# Patient Record
Sex: FEMALE | Race: WHITE | NOT HISPANIC OR LATINO | Employment: OTHER | URBAN - METROPOLITAN AREA
[De-identification: names, ages, dates, MRNs, and addresses within clinical notes are randomized per-mention and may not be internally consistent; named-entity substitution may affect disease eponyms.]

---

## 2020-10-16 ENCOUNTER — OFFICE VISIT (OUTPATIENT)
Dept: GASTROENTEROLOGY | Facility: CLINIC | Age: 75
End: 2020-10-16

## 2020-10-16 VITALS
HEART RATE: 59 BPM | DIASTOLIC BLOOD PRESSURE: 70 MMHG | TEMPERATURE: 97.7 F | HEIGHT: 68 IN | BODY MASS INDEX: 34.68 KG/M2 | SYSTOLIC BLOOD PRESSURE: 161 MMHG | WEIGHT: 228.8 LBS

## 2020-10-16 DIAGNOSIS — K75.4 AUTOIMMUNE HEPATITIS (HCC): ICD-10-CM

## 2020-10-16 DIAGNOSIS — K74.3 PRIMARY BILIARY CHOLANGITIS (HCC): Primary | ICD-10-CM

## 2020-10-16 PROCEDURE — 99203 OFFICE O/P NEW LOW 30 MIN: CPT | Performed by: NURSE PRACTITIONER

## 2020-10-16 RX ORDER — LISINOPRIL AND HYDROCHLOROTHIAZIDE 12.5; 1 MG/1; MG/1
1 TABLET ORAL DAILY
COMMUNITY
Start: 2020-08-11

## 2020-10-16 RX ORDER — INFLUENZA A VIRUS A/MICHIGAN/45/2015 X-275 (H1N1) ANTIGEN (FORMALDEHYDE INACTIVATED), INFLUENZA A VIRUS A/SINGAPORE/INFIMH-16-0019/2016 IVR-186 (H3N2) ANTIGEN (FORMALDEHYDE INACTIVATED), INFLUENZA B VIRUS B/PHUKET/3073/2013 ANTIGEN (FORMALDEHYDE INACTIVATED), AND INFLUENZA B VIRUS B/MARYLAND/15/2016 BX-69A ANTIGEN (FORMALDEHYDE INACTIVATED) 60; 60; 60; 60 UG/.7ML; UG/.7ML; UG/.7ML; UG/.7ML
1 INJECTION, SUSPENSION INTRAMUSCULAR ONCE
Status: ON HOLD | COMMUNITY
Start: 2020-09-25 | End: 2021-08-31

## 2020-10-16 RX ORDER — URSODIOL 300 MG/1
1 CAPSULE ORAL DAILY
COMMUNITY
Start: 2020-09-21 | End: 2020-10-16 | Stop reason: SDUPTHER

## 2020-10-16 RX ORDER — CELECOXIB 200 MG/1
1 CAPSULE ORAL DAILY
Status: ON HOLD | COMMUNITY
Start: 2020-08-11 | End: 2021-06-16

## 2020-10-16 RX ORDER — URSODIOL 300 MG/1
1500 CAPSULE ORAL DAILY
Qty: 150 CAPSULE | Refills: 11 | Status: SHIPPED | OUTPATIENT
Start: 2021-01-01 | End: 2021-11-08

## 2020-10-16 NOTE — PROGRESS NOTES
GASTROENTEROLOGY OFFICE NOTE  Radha Negrete  [id number]  1945    CARE TEAM  Patient Care Team:  Joseluis Cunningham MD as PCP - General  Joseluis Cunningham MD as PCP - Family Medicine    Referring Provider: Joseluis Cunningham MD    Chief Complaint   Patient presents with   • Elevated Hepatic Enzymes     f/u        HISTORY OF PRESENT ILLNESS:  Ms. 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  She was diagnosed at  and has been followed by Dr. Dietrich but would like to transfer her care to [facility].  There is very little information available to me regarding her diagnosis other than an office note from December 2019 from Dr. Dietrich that confirms her diagnosis of PBC/AIH and states that she is tolerating Devin without issue.  Lab work available for review shows liver enzymes in May 2019 AST 19/ALT 10/ and in July 2019 AST 21/ALT 12/.  She had an ultrasound liver in August 2018 that showed fatty infiltration of the liver and blood work at that time showed AST 20/ALT 12/.  She reports that she has had a liver biopsy.  She is not quite certain how long she has been treated for PBC/AIH.  Overall she is doing well without any localizing GI complaints.  She exhibits no stigmata of advanced liver disease.    PAST MEDICAL HISTORY  Past Medical History:   Diagnosis Date   • Arthritis    • Hypertension         PAST SURGICAL HISTORY  Past Surgical History:   Procedure Laterality Date   • BLADDER SURGERY     • CARDIAC SURGERY     • CHOLECYSTECTOMY     • COLONOSCOPY  01/2016    normal   • HYSTERECTOMY          MEDICATIONS:    Current Outpatient Medications:   •  celecoxib (CeleBREX) 200 MG capsule, Take 1 capsule by mouth Daily., Disp: , Rfl:   •  Fluzone High-Dose Quadrivalent 0.7 ML suspension prefilled syringe, Inject 1 mg as directed 1 (One) Time., Disp: , Rfl:   •  lisinopril-hydrochlorothiazide (PRINZIDE,ZESTORETIC) 10-12.5 MG per tablet, Take 1 tablet by mouth  Daily., Disp: , Rfl:   •  [START ON 1/1/2021] ursodiol (ACTIGALL) 300 MG capsule, Take 5 capsules by mouth Daily., Disp: 150 capsule, Rfl: 11    ALLERGIES  Allergies   Allergen Reactions   • Ceftriaxone Other (See Comments)       FAMILY HISTORY:  Family History   Problem Relation Age of Onset   • Colon cancer Neg Hx    • Colon polyps Neg Hx        SOCIAL HISTORY  Social History     Socioeconomic History   • Marital status:      Spouse name: Not on file   • Number of children: Not on file   • Years of education: Not on file   • Highest education level: Not on file   Tobacco Use   • Smoking status: Never Smoker   • Smokeless tobacco: Never Used   Substance and Sexual Activity   • Alcohol use: Never     Frequency: Never   • Drug use: Never   • Sexual activity: Defer       REVIEW OF SYSTEMS  Review of Systems   Constitutional: Negative for activity change, appetite change, chills, diaphoresis, fatigue, fever, unexpected weight gain and unexpected weight loss.   HENT: Negative for congestion, dental problem, drooling, ear discharge, ear pain, facial swelling, hearing loss, mouth sores, nosebleeds, postnasal drip, rhinorrhea, sinus pressure, sneezing, sore throat, swollen glands, tinnitus, trouble swallowing and voice change.    Respiratory: Negative for apnea, cough, choking, chest tightness, shortness of breath, wheezing and stridor.    Cardiovascular: Negative for chest pain, palpitations and leg swelling.   Gastrointestinal: Negative for abdominal distention, abdominal pain, anal bleeding, blood in stool, constipation, diarrhea, nausea, rectal pain, vomiting, GERD and indigestion.   Endocrine: Negative for cold intolerance, heat intolerance, polydipsia, polyphagia and polyuria.   Musculoskeletal: Negative for arthralgias, back pain, gait problem, joint swelling, myalgias, neck pain, neck stiffness and bursitis.   Skin: Negative for color change, dry skin, rash and skin lesions.   Allergic/Immunologic: Negative  "for environmental allergies, food allergies and immunocompromised state.   Neurological: Negative for dizziness, tremors, seizures, syncope, facial asymmetry, speech difficulty, weakness, light-headedness, numbness, headache, memory problem and confusion.   Hematological: Negative for adenopathy. Does not bruise/bleed easily.   Psychiatric/Behavioral: Negative for agitation, behavioral problems, decreased concentration, dysphoric mood, hallucinations, self-injury, sleep disturbance, suicidal ideas, negative for hyperactivity, depressed mood and stress. The patient is not nervous/anxious.          PHYSICAL EXAM   /70 (BP Location: Right arm, Patient Position: Sitting, Cuff Size: Adult)   Pulse 59   Temp 97.7 °F (36.5 °C) (Temporal)   Ht 171.5 cm (67.5\")   Wt 104 kg (228 lb 12.8 oz)   BMI 35.31 kg/m²   Physical Exam  Vitals signs and nursing note reviewed.   Constitutional:       Appearance: Normal appearance. She is well-developed.   HENT:      Head: Normocephalic and atraumatic.      Nose: Nose normal.      Mouth/Throat:      Mouth: Mucous membranes are moist.      Pharynx: Oropharynx is clear.   Eyes:      Pupils: Pupils are equal, round, and reactive to light.   Neck:      Musculoskeletal: Normal range of motion and neck supple.   Cardiovascular:      Rate and Rhythm: Normal rate and regular rhythm.   Pulmonary:      Effort: Pulmonary effort is normal.      Breath sounds: Normal breath sounds. No wheezing or rales.   Abdominal:      General: Bowel sounds are normal.      Palpations: Abdomen is soft. There is no mass.      Tenderness: There is no abdominal tenderness. There is no guarding or rebound.      Hernia: No hernia is present.   Musculoskeletal: Normal range of motion.   Skin:     General: Skin is warm and dry.   Neurological:      Mental Status: She is alert and oriented to person, place, and time.      Cranial Nerves: No cranial nerve deficit.   Psychiatric:         Behavior: Behavior normal.   "       Judgment: Judgment normal.       Results Review:  [facility] records reviewed and discussed with patient.     ASSESSMENT / PLAN  1.  Primary biliary cholangitis/autoimmune hepatitis overlap syndrome  - CBC, CMP, PT/INR  - Continue Devin 1500 mg daily  -Obtain further records from [facility] including liver biopsy and hepatology clinic notes    Return in about 6 months (around 4/16/2021).    I discussed the patients findings and my recommendations with patient    Rocael Cain, APRN

## 2020-10-21 ENCOUNTER — RESULTS ENCOUNTER (OUTPATIENT)
Dept: GASTROENTEROLOGY | Facility: CLINIC | Age: 75
End: 2020-10-21

## 2020-10-21 DIAGNOSIS — K74.3 PRIMARY BILIARY CHOLANGITIS (HCC): ICD-10-CM

## 2020-10-21 DIAGNOSIS — K75.4 AUTOIMMUNE HEPATITIS (HCC): ICD-10-CM

## 2021-01-28 ENCOUNTER — IMMUNIZATION (OUTPATIENT)
Dept: VACCINE CLINIC | Facility: HOSPITAL | Age: 76
End: 2021-01-28

## 2021-01-28 PROCEDURE — 91300 HC SARSCOV02 VAC 30MCG/0.3ML IM: CPT | Performed by: FAMILY MEDICINE

## 2021-01-28 PROCEDURE — 0001A: CPT | Performed by: FAMILY MEDICINE

## 2021-02-18 ENCOUNTER — APPOINTMENT (OUTPATIENT)
Dept: VACCINE CLINIC | Facility: HOSPITAL | Age: 76
End: 2021-02-18

## 2021-02-23 ENCOUNTER — IMMUNIZATION (OUTPATIENT)
Dept: VACCINE CLINIC | Facility: HOSPITAL | Age: 76
End: 2021-02-23

## 2021-02-23 PROCEDURE — 0002A: CPT | Performed by: INTERNAL MEDICINE

## 2021-02-23 PROCEDURE — 91300 HC SARSCOV02 VAC 30MCG/0.3ML IM: CPT | Performed by: INTERNAL MEDICINE

## 2021-04-19 ENCOUNTER — OFFICE VISIT (OUTPATIENT)
Dept: GASTROENTEROLOGY | Facility: CLINIC | Age: 76
End: 2021-04-19

## 2021-04-19 DIAGNOSIS — K74.3 PRIMARY BILIARY CHOLANGITIS (HCC): Primary | ICD-10-CM

## 2021-04-19 DIAGNOSIS — K75.4 AUTOIMMUNE HEPATITIS (HCC): ICD-10-CM

## 2021-04-19 PROCEDURE — 99214 OFFICE O/P EST MOD 30 MIN: CPT | Performed by: NURSE PRACTITIONER

## 2021-04-19 RX ORDER — LORATADINE 10 MG/1
10 TABLET ORAL DAILY
Status: ON HOLD | COMMUNITY
Start: 2021-04-13 | End: 2021-06-16

## 2021-04-19 NOTE — PROGRESS NOTES
GASTROENTEROLOGY OFFICE NOTE  Radha Negrete  [id number]  1945    CARE TEAM  Patient Care Team:  Joseluis Cunningham MD as PCP - General  Joseluis Cunningham MD as PCP - Family Medicine    Referring Provider: Joseluis Cunningham MD    Chief Complaint   Patient presents with   • biliary cholangitis     f/u        HISTORY OF PRESENT ILLNESS:  Ms. Negrete returns in follow-up with overlapping PBC/AIH syndrome, taking ursodiol 1500 mg daily.  She has previously been followed by Dr. Dietrich at , she establish care with me at her last visit in October 2020.  At that time there were not any records available for review regarding her diagnoses.  Since that time records have been received from  which included liver biopsy report, verifying PBC/AIH syndrome.  Also, it appears her last DEXA scan was in 2018 with femoral neck -2.3.  She is not currently on biphosphonate therapy.    Her last colonoscopy was in 2016 with recommendations to repeat colonoscopy again in 10 years.  However, her brother has since had colon polyps making her high risk for colorectal cancer.  She therefore is due for colonoscopy at this time.    PAST MEDICAL HISTORY  Past Medical History:   Diagnosis Date   • Arthritis    • Hypertension         PAST SURGICAL HISTORY  Past Surgical History:   Procedure Laterality Date   • BLADDER SURGERY     • CARDIAC SURGERY     • CHOLECYSTECTOMY     • COLONOSCOPY  01/2016    normal   • HYSTERECTOMY          MEDICATIONS:    Current Outpatient Medications:   •  celecoxib (CeleBREX) 200 MG capsule, Take 1 capsule by mouth Daily., Disp: , Rfl:   •  Fluzone High-Dose Quadrivalent 0.7 ML suspension prefilled syringe, Inject 1 mg as directed 1 (One) Time., Disp: , Rfl:   •  lisinopril-hydrochlorothiazide (PRINZIDE,ZESTORETIC) 10-12.5 MG per tablet, Take 1 tablet by mouth Daily., Disp: , Rfl:   •  loratadine (CLARITIN) 10 MG tablet, Take 10 mg by mouth Daily., Disp: , Rfl:   •  ursodiol (ACTIGALL) 300 MG capsule, Take 5  "capsules by mouth Daily., Disp: 150 capsule, Rfl: 11    ALLERGIES  Allergies   Allergen Reactions   • Ceftriaxone Other (See Comments)       FAMILY HISTORY:  Family History   Problem Relation Age of Onset   • Colon cancer Neg Hx    • Colon polyps Neg Hx        SOCIAL HISTORY  Social History     Socioeconomic History   • Marital status:      Spouse name: Not on file   • Number of children: Not on file   • Years of education: Not on file   • Highest education level: Not on file   Tobacco Use   • Smoking status: Never Smoker   • Smokeless tobacco: Never Used   Vaping Use   • Vaping Use: Never used   Substance and Sexual Activity   • Alcohol use: Never   • Drug use: Never   • Sexual activity: Defer       REVIEW OF SYSTEMS  Review of Systems   Constitutional: Negative for activity change, appetite change, chills, diaphoresis, fatigue, fever, unexpected weight gain and unexpected weight loss.   HENT: Negative for trouble swallowing and voice change.    Gastrointestinal: Negative for abdominal distention, abdominal pain, anal bleeding, blood in stool, constipation, diarrhea, nausea, rectal pain, vomiting, GERD and indigestion.         PHYSICAL EXAM   /84 (BP Location: Left arm, Patient Position: Other (Comment), Cuff Size: Large Adult) Comment (Patient Position): Manual reading  Pulse 90   Temp 98.1 °F (36.7 °C) (Temporal)   Ht 171.5 cm (67.52\")   Wt 105 kg (230 lb 12.8 oz)   BMI 35.59 kg/m²   Physical Exam  Vitals and nursing note reviewed.   Constitutional:       Appearance: Normal appearance. She is well-developed.   HENT:      Head: Normocephalic and atraumatic.      Nose: Nose normal.      Mouth/Throat:      Mouth: Mucous membranes are moist.      Pharynx: Oropharynx is clear.   Eyes:      Pupils: Pupils are equal, round, and reactive to light.   Cardiovascular:      Rate and Rhythm: Normal rate and regular rhythm.   Pulmonary:      Effort: Pulmonary effort is normal.      Breath sounds: Normal " breath sounds. No wheezing or rales.   Abdominal:      General: Bowel sounds are normal.      Palpations: Abdomen is soft. There is no mass.      Tenderness: There is no abdominal tenderness. There is no guarding or rebound.      Hernia: No hernia is present.   Musculoskeletal:         General: Normal range of motion.      Cervical back: Normal range of motion and neck supple.   Skin:     General: Skin is warm and dry.   Neurological:      Mental Status: She is alert and oriented to person, place, and time.      Cranial Nerves: No cranial nerve deficit.   Psychiatric:         Behavior: Behavior normal.         Judgment: Judgment normal.       Results Review:  I have reviewed the patient's new clinical results.  Lab trend on treatment for PBC:  August 2018-AST 20/ALT 12//T bili 0.5  May 2019-AST 19/ALT 10//T bili 0.6  July 2019-AST 21/ALT 12//T bili 0.5  October 2020-AST 19/ALT 12/     ASSESSMENT / PLAN  1. Primary Biliary Cholangitis/autoimmune hepatitis overlapping syndrome  -CBC, CMP, PT/INR  -Continue Devin 1500 mg daily    #Risk for hypothyroidism   -TSH annually (patient reports she has had blood work with her PCP decently that included TSH)  -Obtain records from PCP    #Risk for metabolic bone disease  -DEXA scan every 2-4 years (patient reports that she thinks she had a DEXA scan more recent than 2018)  -Obtain records from PCP    #Risk for vitamin deficiencies A, D, K  -Check annually if bili >2    2.  Screen for colon cancer  3.  Family history: Polyps  -Colonoscopy for colorectal cancer screening    Return in about 6 months (around 10/19/2021).    I discussed the patients findings and my recommendations with patient    KARO Mart

## 2021-04-22 VITALS
TEMPERATURE: 98.1 F | DIASTOLIC BLOOD PRESSURE: 84 MMHG | HEART RATE: 90 BPM | BODY MASS INDEX: 34.98 KG/M2 | HEIGHT: 68 IN | WEIGHT: 230.8 LBS | SYSTOLIC BLOOD PRESSURE: 136 MMHG

## 2021-05-12 RX ORDER — PEG-3350, SODIUM SULFATE, SODIUM CHLORIDE, POTASSIUM CHLORIDE, SODIUM ASCORBATE AND ASCORBIC ACID 7.5-2.691G
1000 KIT ORAL EVERY 12 HOURS
Qty: 1 EACH | Refills: 0 | Status: ON HOLD | OUTPATIENT
Start: 2021-05-12 | End: 2021-08-31

## 2021-05-14 ENCOUNTER — PRIOR AUTHORIZATION (OUTPATIENT)
Dept: GASTROENTEROLOGY | Facility: CLINIC | Age: 76
End: 2021-05-14

## 2021-05-14 NOTE — TELEPHONE ENCOUNTER
AUTH # [id number] GOOD FROM 05/28/2021-06/27/2021 VIA [employer]

## 2021-05-26 ENCOUNTER — TELEPHONE (OUTPATIENT)
Dept: GASTROENTEROLOGY | Facility: CLINIC | Age: 76
End: 2021-05-26

## 2021-05-26 DIAGNOSIS — K75.4 AUTOIMMUNE HEPATITIS (HCC): Primary | ICD-10-CM

## 2021-05-26 DIAGNOSIS — Z12.11 ENCOUNTER FOR COLORECTAL CANCER SCREENING: Primary | ICD-10-CM

## 2021-05-26 DIAGNOSIS — K74.3 PRIMARY BILIARY CHOLANGITIS (HCC): ICD-10-CM

## 2021-05-26 DIAGNOSIS — Z01.818 PREOPERATIVE CLEARANCE: ICD-10-CM

## 2021-05-26 DIAGNOSIS — Z12.12 ENCOUNTER FOR COLORECTAL CANCER SCREENING: Primary | ICD-10-CM

## 2021-05-26 NOTE — TELEPHONE ENCOUNTER
Can you please put a case request in for patient to have a Colonoscopy with Dr. Garcia on 6-21-21. She already has her bowel prep kit of movi-prep

## 2021-05-28 ENCOUNTER — OUTSIDE FACILITY SERVICE (OUTPATIENT)
Dept: GASTROENTEROLOGY | Facility: CLINIC | Age: 76
End: 2021-05-28

## 2021-05-28 PROCEDURE — OUTSIDEPOS PR OUTSIDE POS PLACEHOLDER: Performed by: INTERNAL MEDICINE

## 2021-06-08 ENCOUNTER — TRANSCRIBE ORDERS (OUTPATIENT)
Dept: ADMINISTRATIVE | Facility: HOSPITAL | Age: 76
End: 2021-06-08

## 2021-06-08 DIAGNOSIS — I48.11 LONGSTANDING PERSISTENT ATRIAL FIBRILLATION (HCC): Primary | ICD-10-CM

## 2021-06-15 PROBLEM — Z12.11 ENCOUNTER FOR COLORECTAL CANCER SCREENING: Status: ACTIVE | Noted: 2021-06-15

## 2021-06-15 PROBLEM — Z12.12 ENCOUNTER FOR COLORECTAL CANCER SCREENING: Status: ACTIVE | Noted: 2021-06-15

## 2021-06-16 ENCOUNTER — HOSPITAL ENCOUNTER (OUTPATIENT)
Facility: HOSPITAL | Age: 76
Discharge: HOME | End: 2021-06-16
Attending: INTERNAL MEDICINE | Admitting: INTERNAL MEDICINE

## 2021-06-16 ENCOUNTER — HOSPITAL ENCOUNTER (OUTPATIENT)
Dept: CARDIOLOGY | Facility: HOSPITAL | Age: 76
Discharge: HOME | End: 2021-06-16

## 2021-06-16 VITALS
WEIGHT: 226.41 LBS | BODY MASS INDEX: 35.54 KG/M2 | RESPIRATION RATE: 16 BRPM | DIASTOLIC BLOOD PRESSURE: 97 MMHG | HEART RATE: 87 BPM | HEIGHT: 67 IN | SYSTOLIC BLOOD PRESSURE: 117 MMHG | OXYGEN SATURATION: 98 % | TEMPERATURE: 98.8 F

## 2021-06-16 VITALS — HEART RATE: 67 BPM | DIASTOLIC BLOOD PRESSURE: 76 MMHG | SYSTOLIC BLOOD PRESSURE: 123 MMHG | OXYGEN SATURATION: 100 %

## 2021-06-16 DIAGNOSIS — I48.11 LONGSTANDING PERSISTENT ATRIAL FIBRILLATION (HCC): ICD-10-CM

## 2021-06-16 DIAGNOSIS — I48.0 PAROXYSMAL ATRIAL FIBRILLATION (HCC): ICD-10-CM

## 2021-06-16 LAB
DEPRECATED RDW RBC AUTO: 50.7 FL (ref 37–54)
ERYTHROCYTE [DISTWIDTH] IN BLOOD BY AUTOMATED COUNT: 14.3 % (ref 12.3–15.4)
HCT VFR BLD AUTO: 36.3 % (ref 34–46.6)
HGB BLD-MCNC: 12 G/DL (ref 12–15.9)
MAXIMAL PREDICTED HEART RATE: 146 BPM
MCH RBC QN AUTO: 32 PG (ref 26.6–33)
MCHC RBC AUTO-ENTMCNC: 33.1 G/DL (ref 31.5–35.7)
MCV RBC AUTO: 96.8 FL (ref 79–97)
PLATELET # BLD AUTO: 302 10*3/MM3 (ref 140–450)
PMV BLD AUTO: 9.2 FL (ref 6–12)
RBC # BLD AUTO: 3.75 10*6/MM3 (ref 3.77–5.28)
STRESS TARGET HR: 124 BPM
WBC # BLD AUTO: 6.37 10*3/MM3 (ref 3.4–10.8)

## 2021-06-16 PROCEDURE — 92960 CARDIOVERSION ELECTRIC EXT: CPT

## 2021-06-16 PROCEDURE — 85027 COMPLETE CBC AUTOMATED: CPT

## 2021-06-16 PROCEDURE — 25010000002 MIDAZOLAM PER 1 MG: Performed by: INTERNAL MEDICINE

## 2021-06-16 PROCEDURE — 25010000002 FENTANYL CITRATE (PF) 50 MCG/ML SOLUTION: Performed by: INTERNAL MEDICINE

## 2021-06-16 RX ORDER — MIDAZOLAM HYDROCHLORIDE 1 MG/ML
INJECTION INTRAMUSCULAR; INTRAVENOUS
Status: DISCONTINUED
Start: 2021-06-16 | End: 2021-06-16 | Stop reason: HOSPADM

## 2021-06-16 RX ORDER — FENTANYL CITRATE 50 UG/ML
INJECTION, SOLUTION INTRAMUSCULAR; INTRAVENOUS
Status: DISCONTINUED
Start: 2021-06-16 | End: 2021-06-16 | Stop reason: HOSPADM

## 2021-06-16 RX ORDER — FENTANYL CITRATE 50 UG/ML
INJECTION, SOLUTION INTRAMUSCULAR; INTRAVENOUS
Status: COMPLETED | OUTPATIENT
Start: 2021-06-16 | End: 2021-06-16

## 2021-06-16 RX ORDER — FLUMAZENIL 0.1 MG/ML
INJECTION INTRAVENOUS
Status: DISCONTINUED
Start: 2021-06-16 | End: 2021-06-16 | Stop reason: WASHOUT

## 2021-06-16 RX ORDER — NALOXONE HYDROCHLORIDE 0.4 MG/ML
INJECTION, SOLUTION INTRAMUSCULAR; INTRAVENOUS; SUBCUTANEOUS
Status: DISCONTINUED
Start: 2021-06-16 | End: 2021-06-16 | Stop reason: WASHOUT

## 2021-06-16 RX ORDER — MIDAZOLAM HYDROCHLORIDE 1 MG/ML
INJECTION INTRAMUSCULAR; INTRAVENOUS
Status: COMPLETED | OUTPATIENT
Start: 2021-06-16 | End: 2021-06-16

## 2021-06-16 RX ADMIN — MIDAZOLAM 2 MG: 1 INJECTION INTRAMUSCULAR; INTRAVENOUS at 14:16

## 2021-06-16 RX ADMIN — FENTANYL CITRATE 50 MCG: 50 INJECTION, SOLUTION INTRAMUSCULAR; INTRAVENOUS at 14:16

## 2021-06-16 RX ADMIN — MIDAZOLAM 2 MG: 1 INJECTION INTRAMUSCULAR; INTRAVENOUS at 14:17

## 2021-06-16 RX ADMIN — FENTANYL CITRATE 50 MCG: 50 INJECTION, SOLUTION INTRAMUSCULAR; INTRAVENOUS at 14:17

## 2021-06-16 NOTE — H&P
Pre-ECV Report  Cardiovascular Laboratory  [facility]      Patient:  Radha Negrete  :  1945  PCP:  Joseluis Cunningham MD  PHONE:  [phone]    DATE: 2021    BRIEF HPI:  Radha Negrete is a 75 y.o. female with hypertension, obesity, and persistent atrial fibrillation.  She is complaining of shortness of breath that is been occurring for the past 3 to 4 weeks.  She states it is moderate severity lasting minutes with associated dyspnea on exertion, fatigue, and tachycardia.  Her symptoms increased with stress and are decreased with rest.  She was recently evaluated in clinic and now presents for external cardioversion.  She is chronically anticoagulated with Xarelto.    Cardiac Risk Factors:  advanced age (older than 55 for men, 65 for women), family history of premature cardiovascular disease, hypertension, obesity (BMI >= 30 kg/m2)    Anginal class in last 2 weeks:  No anginal symptoms    CHF Class in last 2 weeks:  NYHA Class II    Cardiogenic shock:  no    Cardiac arrest <24 hours:  no    Stress test within last 6 months:   no   Details:    Previous cardiac catheterization:  no  Details:     Previous CABG:  no  Details:      Allergies:     IV contrast allergy:  no  Allergies   Allergen Reactions   • Ceftriaxone Other (See Comments)       MEDICATIONS:  Prior to Admission medications    Medication Sig Start Date End Date Taking? Authorizing Provider   celecoxib (CeleBREX) 200 MG capsule Take 1 capsule by mouth Daily. 20   Ny Barron MD   Fluzone High-Dose Quadrivalent 0.7 ML suspension prefilled syringe Inject 1 mg as directed 1 (One) Time. 20   Ny Barron MD   lisinopril-hydrochlorothiazide (PRINZIDE,ZESTORETIC) 10-12.5 MG per tablet Take 1 tablet by mouth Daily. 20   Ny Barron MD   loratadine (CLARITIN) 10 MG tablet Take 10 mg by mouth Daily. 21   Ny Barron MD   PEG-KCl-NaCl-NaSulf-Na Asc-C (MoviPrep) 100 g  reconstituted solution powder Take 1,000 mL by mouth Every 12 (Twelve) Hours. DO NOT EAT THE DAY BEFORE YOUR PROCEDURE SEE DIET INSTRUCTIONS. 5/12/21   Gavin Garcia MD   ursodiol (ACTIGALL) 300 MG capsule Take 5 capsules by mouth Daily. 1/1/21   Rocael Cain APRN       Past medical & surgical history, social and family history reviewed in the electronic medical record.    ROS:  Cardiovascular ROS: positive for - dyspnea on exertion, irregular heartbeat, rapid heart rate and shortness of breath    Physical Exam:    Vitals: There were no vitals filed for this visit. There were no vitals filed for this visit.    General Appearance:    Alert, cooperative, in no acute distress   Head:    Normocephalic, without obvious abnormality, atraumatic   Eyes:            Lids and lashes normal, conjunctivae and sclerae normal, no   icterus, no pallor, corneas clear, PERRLA   Ears:    Ears appear intact with no abnormalities noted   Neck:   No adenopathy, supple, trachea midline, no thyromegaly, no   carotid bruit, no JVD   Back:     No kyphosis present, no scoliosis present, range of motion normal   Lungs:     Clear to auscultation,respirations regular, even and                unlabored    Heart:    Irregular rhythm and normal rate, normal S1 and S2, no         murmur, no gallop, no rub, no click   Chest Wall:    No abnormalities observed   Abdomen:     Normal bowel sounds, no masses, no organomegaly, soft     nontender, nondistended, no guarding, no rebound                tenderness   Rectal:     Deferred   Extremities:   Moves all extremities well, no edema, no cyanosis, no           redness   Pulses:   Pulses palpable and equal bilaterally   Skin:   No bleeding, bruising or rash   Neurologic:   Cranial nerves 2 - 12 grossly intact, sensation intact     Barbaeu Test:  Left: Not Assessed  (oxymetric Allens) Right: Not Assessed             No results found for: CHLPL, TRIG, HDL, LDLDIRECT, AST, ALT         Impression      · Persistent atrial fibrillation    Plan     · Procedure to perform: External cardioversion                STACIE Hernández   06/16/21  13:16 EDT

## 2021-08-31 ENCOUNTER — HOSPITAL ENCOUNTER (OUTPATIENT)
Dept: CARDIOLOGY | Facility: HOSPITAL | Age: 76
Discharge: HOME | End: 2021-08-31
Attending: INTERNAL MEDICINE | Admitting: INTERNAL MEDICINE

## 2021-08-31 VITALS
OXYGEN SATURATION: 98 % | BODY MASS INDEX: 35.6 KG/M2 | HEIGHT: 67 IN | WEIGHT: 226.8 LBS | RESPIRATION RATE: 16 BRPM | SYSTOLIC BLOOD PRESSURE: 117 MMHG | HEART RATE: 53 BPM | DIASTOLIC BLOOD PRESSURE: 62 MMHG | TEMPERATURE: 97 F

## 2021-08-31 DIAGNOSIS — I48.11 LONGSTANDING PERSISTENT ATRIAL FIBRILLATION (HCC): ICD-10-CM

## 2021-08-31 LAB
ANION GAP SERPL CALCULATED.3IONS-SCNC: 10 MMOL/L (ref 5–15)
BUN SERPL-MCNC: 25 MG/DL (ref 8–23)
BUN/CREAT SERPL: 28.4 (ref 7–25)
CALCIUM SPEC-SCNC: 9.3 MG/DL (ref 8.6–10.5)
CHLORIDE SERPL-SCNC: 101 MMOL/L (ref 98–107)
CO2 SERPL-SCNC: 27 MMOL/L (ref 22–29)
CREAT SERPL-MCNC: 0.88 MG/DL (ref 0.57–1)
DEPRECATED RDW RBC AUTO: 54.4 FL (ref 37–54)
ERYTHROCYTE [DISTWIDTH] IN BLOOD BY AUTOMATED COUNT: 15.1 % (ref 12.3–15.4)
GFR SERPL CREATININE-BSD FRML MDRD: 63 ML/MIN/1.73
GLUCOSE SERPL-MCNC: 91 MG/DL (ref 65–99)
HCT VFR BLD AUTO: 27.4 % (ref 34–46.6)
HGB BLD-MCNC: 9.1 G/DL (ref 12–15.9)
MAXIMAL PREDICTED HEART RATE: 146 BPM
MCH RBC QN AUTO: 32.9 PG (ref 26.6–33)
MCHC RBC AUTO-ENTMCNC: 33.2 G/DL (ref 31.5–35.7)
MCV RBC AUTO: 98.9 FL (ref 79–97)
PLATELET # BLD AUTO: 357 10*3/MM3 (ref 140–450)
PMV BLD AUTO: 9 FL (ref 6–12)
POTASSIUM SERPL-SCNC: 3.7 MMOL/L (ref 3.5–5.2)
RBC # BLD AUTO: 2.77 10*6/MM3 (ref 3.77–5.28)
SODIUM SERPL-SCNC: 138 MMOL/L (ref 136–145)
STRESS TARGET HR: 124 BPM
WBC # BLD AUTO: 9.17 10*3/MM3 (ref 3.4–10.8)

## 2021-08-31 PROCEDURE — 93005 ELECTROCARDIOGRAM TRACING: CPT | Performed by: INTERNAL MEDICINE

## 2021-08-31 PROCEDURE — 25010000002 MIDAZOLAM PER 1 MG: Performed by: INTERNAL MEDICINE

## 2021-08-31 PROCEDURE — 92960 CARDIOVERSION ELECTRIC EXT: CPT

## 2021-08-31 PROCEDURE — 85027 COMPLETE CBC AUTOMATED: CPT

## 2021-08-31 PROCEDURE — 36415 COLL VENOUS BLD VENIPUNCTURE: CPT

## 2021-08-31 PROCEDURE — 80048 BASIC METABOLIC PNL TOTAL CA: CPT

## 2021-08-31 PROCEDURE — 25010000002 FENTANYL CITRATE (PF) 50 MCG/ML SOLUTION: Performed by: INTERNAL MEDICINE

## 2021-08-31 RX ORDER — FENTANYL CITRATE 50 UG/ML
INJECTION, SOLUTION INTRAMUSCULAR; INTRAVENOUS
Status: COMPLETED | OUTPATIENT
Start: 2021-08-31 | End: 2021-08-31

## 2021-08-31 RX ORDER — FLECAINIDE ACETATE 50 MG/1
50 TABLET ORAL EVERY 12 HOURS
Status: ON HOLD | COMMUNITY
End: 2022-02-22 | Stop reason: SDUPTHER

## 2021-08-31 RX ORDER — FENTANYL CITRATE 50 UG/ML
INJECTION, SOLUTION INTRAMUSCULAR; INTRAVENOUS
Status: DISCONTINUED
Start: 2021-08-31 | End: 2021-08-31 | Stop reason: HOSPADM

## 2021-08-31 RX ORDER — MIDAZOLAM HYDROCHLORIDE 1 MG/ML
INJECTION INTRAMUSCULAR; INTRAVENOUS
Status: COMPLETED | OUTPATIENT
Start: 2021-08-31 | End: 2021-08-31

## 2021-08-31 RX ORDER — MIDAZOLAM HYDROCHLORIDE 1 MG/ML
INJECTION INTRAMUSCULAR; INTRAVENOUS
Status: DISCONTINUED
Start: 2021-08-31 | End: 2021-08-31 | Stop reason: HOSPADM

## 2021-08-31 RX ADMIN — MIDAZOLAM 2 MG: 1 INJECTION INTRAMUSCULAR; INTRAVENOUS at 14:00

## 2021-08-31 RX ADMIN — MIDAZOLAM 2 MG: 1 INJECTION INTRAMUSCULAR; INTRAVENOUS at 14:02

## 2021-08-31 RX ADMIN — FENTANYL CITRATE 50 MCG: 50 INJECTION, SOLUTION INTRAMUSCULAR; INTRAVENOUS at 14:01

## 2021-08-31 RX ADMIN — FENTANYL CITRATE 50 MCG: 50 INJECTION, SOLUTION INTRAMUSCULAR; INTRAVENOUS at 14:02

## 2021-09-01 LAB
QT INTERVAL: 426 MS
QTC INTERVAL: 414 MS

## 2021-10-19 ENCOUNTER — OFFICE VISIT (OUTPATIENT)
Dept: GASTROENTEROLOGY | Facility: CLINIC | Age: 76
End: 2021-10-19

## 2021-10-19 VITALS
DIASTOLIC BLOOD PRESSURE: 66 MMHG | WEIGHT: 227.4 LBS | HEIGHT: 67 IN | TEMPERATURE: 97.3 F | BODY MASS INDEX: 35.69 KG/M2 | HEART RATE: 62 BPM | SYSTOLIC BLOOD PRESSURE: 177 MMHG

## 2021-10-19 DIAGNOSIS — K75.4 AUTOIMMUNE HEPATITIS (HCC): ICD-10-CM

## 2021-10-19 DIAGNOSIS — Z09 ENCOUNTER FOR FOLLOW-UP EXAMINATION AFTER COMPLETED TREATMENT FOR CONDITIONS OTHER THAN MALIGNANT NEOPLASM: ICD-10-CM

## 2021-10-19 DIAGNOSIS — I48.0 PAROXYSMAL ATRIAL FIBRILLATION (HCC): ICD-10-CM

## 2021-10-19 DIAGNOSIS — K74.3 PRIMARY BILIARY CHOLANGITIS (HCC): Primary | ICD-10-CM

## 2021-10-19 PROCEDURE — 99214 OFFICE O/P EST MOD 30 MIN: CPT | Performed by: NURSE PRACTITIONER

## 2021-10-19 RX ORDER — FLUTICASONE PROPIONATE 50 MCG
SPRAY, SUSPENSION (ML) NASAL
COMMUNITY
Start: 2021-09-03

## 2021-10-19 NOTE — PROGRESS NOTES
GASTROENTEROLOGY OFFICE NOTE  Radha Negrete  [id number]  1945    CARE TEAM  Patient Care Team:  Joseluis Cunningham MD as PCP - General  Joseluis Cunningham MD as PCP - Family Medicine    Referring Provider: Joseluis Cunningham MD    Chief Complaint   Patient presents with   • Autoimmune Hepatitis   • Primary Biliary Cholangitis        HISTORY OF PRESENT ILLNESS:  Ms. Negrete returns in follow-up with overlapping PBC/AIH syndrome, biopsy February 2019.  She was treated at  prior to establishing care with [facility] in October 2020.  Review of her records available indicate that she has a history of primary biliary cholangitis and was on medical therapy with persistently elevated alkaline phosphatase, positive KIMMY, positive ASMA, and elevated IgG at the time of the biopsy.  She has been on treatment with Ursodil 15 mg/kg/day (1500 mg/day).  It is unclear how long she has been on ursodiol although certainly greater than 1 year as her biopsy report from February 2019 indicates that she was on treatment at that time.  Since assuming her care in October 2020, the ALP has remained elevated and AST/ALT/T bili normal.  ALP trended downward to 219 in July 2019 but has been trending back upward since then, 227 in November 2020 and most recently 216 in May 2021.  Of note, no fibrosis noted on liver biopsy.    Her last colonoscopy was in 2016 with recommendations to repeat colonoscopy again in 10 years.  However, her brother has since had colon polyps making her high risk for colorectal cancer.  She therefore is due for colonoscopy at this time. This was scheduled at her last visit but she had to cancel due to her husbands health and subsequently she was diagnosed with new onset atrial fibrillation. She underwent external cardioversion, restoring normal sinus rhythm in June of this year and in August required external cardioversion. She is chronically anticoagulated with Xarelto.     PAST MEDICAL  HISTORY  Past Medical History:   Diagnosis Date   • Arthritis    • Hypertension         PAST SURGICAL HISTORY  Past Surgical History:   Procedure Laterality Date   • BLADDER SURGERY     • CARDIAC SURGERY     • CHOLECYSTECTOMY     • COLONOSCOPY  01/2016    normal   • HYSTERECTOMY          MEDICATIONS:    Current Outpatient Medications:   •  flecainide (TAMBOCOR) 50 MG tablet, Take 50 mg by mouth Every 12 (Twelve) Hours., Disp: , Rfl:   •  lisinopril-hydrochlorothiazide (PRINZIDE,ZESTORETIC) 10-12.5 MG per tablet, Take 1 tablet by mouth Daily., Disp: , Rfl:   •  metoprolol tartrate (LOPRESSOR) 25 MG tablet, Take 25 mg by mouth Daily., Disp: , Rfl:   •  rivaroxaban (XARELTO) 20 MG tablet, Take 20 mg by mouth Daily., Disp: , Rfl:   •  ursodiol (ACTIGALL) 300 MG capsule, Take 5 capsules by mouth Daily., Disp: 150 capsule, Rfl: 11  •  Diclofenac Sodium (VOLTAREN) 1 % gel gel, APPLY TO THE AFFECTED AREA(S) TWICE A DAY, Disp: , Rfl:   •  fluticasone (FLONASE) 50 MCG/ACT nasal spray, USE 2 SPRAYS IN EACH NOSTRIL ONCE DAILY - SHAKE BOTTLE GENTLY BEFORE EACH USE, Disp: , Rfl:     ALLERGIES  Allergies   Allergen Reactions   • Ceftriaxone Itching   • Latex Itching       FAMILY HISTORY:  Family History   Problem Relation Age of Onset   • Colon polyps Brother    • Colon cancer Neg Hx        SOCIAL HISTORY  Social History     Socioeconomic History   • Marital status:    Tobacco Use   • Smoking status: Never Smoker   • Smokeless tobacco: Never Used   Vaping Use   • Vaping Use: Never used   Substance and Sexual Activity   • Alcohol use: Never   • Drug use: Never   • Sexual activity: Defer       REVIEW OF SYSTEMS  Review of Systems   Constitutional: Negative for activity change, appetite change, chills, diaphoresis, fatigue, fever, unexpected weight gain and unexpected weight loss.   HENT: Negative for trouble swallowing and voice change.    Gastrointestinal: Negative for abdominal distention, abdominal pain, anal bleeding,  "blood in stool, constipation, diarrhea, nausea, rectal pain, vomiting, GERD and indigestion.         PHYSICAL EXAM   /66 (BP Location: Left arm, Patient Position: Sitting, Cuff Size: Adult)   Pulse 62   Temp 97.3 °F (36.3 °C) (Temporal)   Ht 170.2 cm (67\")   Wt 103 kg (227 lb 6.4 oz)   BMI 35.62 kg/m²   Physical Exam  Vitals and nursing note reviewed.   Constitutional:       Appearance: Normal appearance. She is well-developed.   HENT:      Head: Normocephalic and atraumatic.      Nose: Nose normal.      Mouth/Throat:      Mouth: Mucous membranes are moist.      Pharynx: Oropharynx is clear.   Eyes:      Pupils: Pupils are equal, round, and reactive to light.   Cardiovascular:      Rate and Rhythm: Normal rate and regular rhythm.   Pulmonary:      Effort: Pulmonary effort is normal.      Breath sounds: Normal breath sounds. No wheezing or rales.   Abdominal:      General: Bowel sounds are normal.      Palpations: Abdomen is soft. There is no mass.      Tenderness: There is no abdominal tenderness. There is no guarding or rebound.      Hernia: No hernia is present.   Musculoskeletal:         General: Normal range of motion.      Cervical back: Normal range of motion and neck supple.   Skin:     General: Skin is warm and dry.   Neurological:      Mental Status: She is alert and oriented to person, place, and time.      Cranial Nerves: No cranial nerve deficit.   Psychiatric:         Behavior: Behavior normal.         Judgment: Judgment normal.           Results Review:  Lab trend -  August 2018 AST 21/ALT 12/   February 2019 AST 21/ALT 13//T bili 0.6  May 2019 AST 19/ALT 10//T bili 0.6  July 2019 AST 21/ALT 12//T bili 0.5  December 2019 AST 22/ALT 14//T bili 0.5  August 2020 AST 19/ALT 12//T bili 0.5  November 2020 AST 20/ALT 13//T bili 0.6  May 2021 AST 12/ALT 21//T bili 0.6    Discussion:  It is somewhat concerning that her alkaline phosphatase has " never normalized and more so that it is now trending upward.  Liver biopsy in February 2019 shows no fibrosis. Studies have shown that histologic progression to cirrhosis is significantly lower with the use of ursodiol and treatment for PBC.  On average, histologic stage progression is by 1 stage every 1.5 years.      The bilirubin level is the best predictor of survival and is the most important component in all mathematical models of prognosis and PBC.  Serum ALP less than twice the upper limit of normal with treatment is a reliable predictor of treatment response.  In patients without cirrhosis, the degree of elevation in ALP is strongly related to the severity of ductopenia and inflammation.  Response to treatment for PBC is specifically monitored by ALP and total bilirubin.  Improvement in liver test are typically observed within a few weeks and about 20% of patients will have normalization of liver biochemistries after 2 years of treatment.    Ms. Negrete is showing an adequate response to treatment with ursodiol.  Although her ALP has not quite reached 2 times normal limits. Second line therapy should be considered.  Options of second line therapy include obeticholic acid (OCA) or fibrate.    ASSESSMENT / PLAN  1. Primary Biliary Cholangitis/autoimmune hepatitis overlapping syndrome  -CBC, CMP, PT/INR  -Consider second line therapy if ALP 2 times normal limits  -Continue Devin 1500 mg daily     #Risk for hypothyroidism   -TSH     #Risk for metabolic bone disease  Last DEXA scan August 2018-osteopenia  -DEXA scan      2.  Screen for colon cancer  3.  Family history: Polyps  -Colonoscopy for colorectal cancer screening    Return in about 6 months (around 4/19/2022).    I discussed the patients findings and my recommendations with patient    KARO Mart

## 2021-10-20 LAB
ALBUMIN SERPL-MCNC: 4.2 G/DL (ref 3.5–5.2)
ALBUMIN/GLOB SERPL: 1.4 G/DL
ALP SERPL-CCNC: 211 U/L (ref 39–117)
ALT SERPL-CCNC: 10 U/L (ref 1–33)
AST SERPL-CCNC: 20 U/L (ref 1–32)
BASOPHILS # BLD AUTO: 0.03 10*3/MM3 (ref 0–0.2)
BASOPHILS NFR BLD AUTO: 0.4 % (ref 0–1.5)
BILIRUB SERPL-MCNC: 0.6 MG/DL (ref 0–1.2)
BUN SERPL-MCNC: 18 MG/DL (ref 8–23)
BUN/CREAT SERPL: 25 (ref 7–25)
CALCIUM SERPL-MCNC: 9 MG/DL (ref 8.6–10.5)
CHLORIDE SERPL-SCNC: 102 MMOL/L (ref 98–107)
CO2 SERPL-SCNC: 30.7 MMOL/L (ref 22–29)
CREAT SERPL-MCNC: 0.72 MG/DL (ref 0.57–1)
EOSINOPHIL # BLD AUTO: 0.14 10*3/MM3 (ref 0–0.4)
EOSINOPHIL NFR BLD AUTO: 2 % (ref 0.3–6.2)
ERYTHROCYTE [DISTWIDTH] IN BLOOD BY AUTOMATED COUNT: 14.9 % (ref 12.3–15.4)
GLOBULIN SER CALC-MCNC: 2.9 GM/DL
GLUCOSE SERPL-MCNC: 92 MG/DL (ref 65–99)
HCT VFR BLD AUTO: 36.1 % (ref 34–46.6)
HGB BLD-MCNC: 11.3 G/DL (ref 12–15.9)
IMM GRANULOCYTES # BLD AUTO: 0.01 10*3/MM3 (ref 0–0.05)
IMM GRANULOCYTES NFR BLD AUTO: 0.1 % (ref 0–0.5)
INR PPP: 2.09 (ref 0.85–1.16)
LYMPHOCYTES # BLD AUTO: 2.11 10*3/MM3 (ref 0.7–3.1)
LYMPHOCYTES NFR BLD AUTO: 30.4 % (ref 19.6–45.3)
MCH RBC QN AUTO: 32.7 PG (ref 26.6–33)
MCHC RBC AUTO-ENTMCNC: 31.3 G/DL (ref 31.5–35.7)
MCV RBC AUTO: 104.3 FL (ref 79–97)
MONOCYTES # BLD AUTO: 0.54 10*3/MM3 (ref 0.1–0.9)
MONOCYTES NFR BLD AUTO: 7.8 % (ref 5–12)
NEUTROPHILS # BLD AUTO: 4.1 10*3/MM3 (ref 1.7–7)
NEUTROPHILS NFR BLD AUTO: 59.3 % (ref 42.7–76)
NRBC BLD AUTO-RTO: 0 /100 WBC (ref 0–0.2)
PLATELET # BLD AUTO: 306 10*3/MM3 (ref 140–450)
POTASSIUM SERPL-SCNC: 4.1 MMOL/L (ref 3.5–5.2)
PROT SERPL-MCNC: 7.1 G/DL (ref 6–8.5)
PROTHROMBIN TIME: 22.6 SECONDS (ref 11.4–14.4)
RBC # BLD AUTO: 3.46 10*6/MM3 (ref 3.77–5.28)
SODIUM SERPL-SCNC: 142 MMOL/L (ref 136–145)
TSH SERPL DL<=0.005 MIU/L-ACNC: 1.42 UIU/ML (ref 0.27–4.2)
WBC # BLD AUTO: 6.93 10*3/MM3 (ref 3.4–10.8)

## 2021-11-01 ENCOUNTER — TELEPHONE (OUTPATIENT)
Dept: GASTROENTEROLOGY | Facility: CLINIC | Age: 76
End: 2021-11-01

## 2021-11-01 NOTE — TELEPHONE ENCOUNTER
Called patient and reminded her to schedule her DEXA Bone Density Axial test at [phone]. Patient verbalized understanding and stated she wouls call as soon as it was convenient for her to do so.

## 2021-11-09 RX ORDER — URSODIOL 300 MG/1
1500 CAPSULE ORAL DAILY
Qty: 150 CAPSULE | Refills: 11 | Status: SHIPPED | OUTPATIENT
Start: 2021-11-09 | End: 2023-01-06

## 2021-11-24 ENCOUNTER — TELEPHONE (OUTPATIENT)
Dept: GASTROENTEROLOGY | Facility: CLINIC | Age: 76
End: 2021-11-24

## 2021-12-10 ENCOUNTER — TELEPHONE (OUTPATIENT)
Dept: GASTROENTEROLOGY | Facility: CLINIC | Age: 76
End: 2021-12-10

## 2021-12-10 NOTE — TELEPHONE ENCOUNTER
I called  Patient to follow up if she ever scheduled the Dexa Scan as she requested it be completed outside of [facility] and due to her busy schedule was going to personally call/schedule it herself with [facility].No answer unable to leave voice message.

## 2022-01-26 RX ORDER — SODIUM, POTASSIUM,MAG SULFATES 17.5-3.13G
SOLUTION, RECONSTITUTED, ORAL ORAL
Qty: 354 ML | Refills: 0 | Status: ON HOLD | OUTPATIENT
Start: 2022-01-26 | End: 2022-08-24

## 2022-02-22 ENCOUNTER — HOSPITAL ENCOUNTER (OUTPATIENT)
Dept: CARDIOLOGY | Facility: HOSPITAL | Age: 77
Discharge: HOME | End: 2022-02-22
Attending: INTERNAL MEDICINE | Admitting: INTERNAL MEDICINE

## 2022-02-22 VITALS
WEIGHT: 224.8 LBS | SYSTOLIC BLOOD PRESSURE: 171 MMHG | RESPIRATION RATE: 18 BRPM | HEART RATE: 69 BPM | TEMPERATURE: 97.3 F | DIASTOLIC BLOOD PRESSURE: 77 MMHG | OXYGEN SATURATION: 98 % | HEIGHT: 67 IN | BODY MASS INDEX: 35.28 KG/M2

## 2022-02-22 DIAGNOSIS — I48.11 LONGSTANDING PERSISTENT ATRIAL FIBRILLATION: ICD-10-CM

## 2022-02-22 LAB
MAXIMAL PREDICTED HEART RATE: 145 BPM
STRESS TARGET HR: 123 BPM

## 2022-02-22 PROCEDURE — 36415 COLL VENOUS BLD VENIPUNCTURE: CPT

## 2022-02-22 PROCEDURE — 92960 CARDIOVERSION ELECTRIC EXT: CPT

## 2022-02-22 PROCEDURE — 93005 ELECTROCARDIOGRAM TRACING: CPT | Performed by: INTERNAL MEDICINE

## 2022-02-22 RX ORDER — MULTIPLE VITAMINS W/ MINERALS TAB 9MG-400MCG
2 TAB ORAL DAILY
COMMUNITY

## 2022-02-22 RX ORDER — FLECAINIDE ACETATE 50 MG/1
100 TABLET ORAL 2 TIMES DAILY
Qty: 60 TABLET | Refills: 5 | Status: SHIPPED | OUTPATIENT
Start: 2022-02-22

## 2022-02-22 NOTE — H&P
[facility] History & Physical    Referring Provider:     Patient Care Team:  Joseluis Cunningham MD as PCP - General  Joseluis Cunningham MD as PCP - Family Medicine    Chief complaint No chief complaint on file.      Subjective .     History of present illness: Elderly white female with persistent atrial fibrillation presents for direct-current cardioversion.  EKG shows he is in normal sinus rhythm with her flecainide and therefore no cardioversion is required.    Review of Systems   All systems were reviewed and negative except for:  Constitution:  positive for fatigue    History  Past Medical History:   Diagnosis Date   • Arthritis    • Hypertension    • PONV (postoperative nausea and vomiting)    ,   Past Surgical History:   Procedure Laterality Date   • BLADDER SURGERY     • CARDIAC SURGERY     • CHOLECYSTECTOMY     • COLONOSCOPY  01/2016    normal   • HYSTERECTOMY     ,   Family History   Problem Relation Age of Onset   • Colon polyps Brother    • Colon cancer Neg Hx    ,   Social History     Tobacco Use   • Smoking status: Never Smoker   • Smokeless tobacco: Never Used   Vaping Use   • Vaping Use: Never used   Substance Use Topics   • Alcohol use: Never   • Drug use: Never   ,   Medications Prior to Admission   Medication Sig Dispense Refill Last Dose   • flecainide (TAMBOCOR) 50 MG tablet Take 50 mg by mouth Every 12 (Twelve) Hours.   2/22/2022 at 0700   • lisinopril-hydrochlorothiazide (PRINZIDE,ZESTORETIC) 10-12.5 MG per tablet Take 1 tablet by mouth Daily.   2/22/2022 at 0700   • multivitamin with minerals (VISION-WATSON PRESERVE PO) Take 2 tablets by mouth Daily.   2/21/2022 at Unknown time   • rivaroxaban (XARELTO) 20 MG tablet Take 15 mg by mouth Daily.   2/22/2022 at 0700   • ursodiol (ACTIGALL) 300 MG capsule TAKE 5 CAPSULES BY MOUTH  DAILY 150 capsule 11 2/21/2022 at Unknown time   • Diclofenac Sodium (VOLTAREN) 1 % gel gel APPLY TO THE AFFECTED AREA(S) TWICE A DAY   More than a month  "at Unknown time   • fluticasone (FLONASE) 50 MCG/ACT nasal spray USE 2 SPRAYS IN EACH NOSTRIL ONCE DAILY - SHAKE BOTTLE GENTLY BEFORE EACH USE   More than a month at Unknown time   • sodium-potassium-magnesium sulfates (Suprep Bowel Prep Kit) 17.5-3.13-1.6 GM/177ML solution oral solution Please follow the directions mailed to you by our office. If you have any questions call our office at [phone] mL 0    , Scheduled Meds:  , Continuous Infusions:  No current facility-administered medications for this encounter.  , PRN Meds:   and Allergies:  Ceftriaxone and Latex    Objective     Vital Sign Min/Max for last 24 hours  Temp  Min: 97.3 °F (36.3 °C)  Max: 97.3 °F (36.3 °C)   BP  Min: 171/77  Max: 176/73   Pulse  Min: 69  Max: 69   Resp  Min: 18  Max: 18   SpO2  Min: 97 %  Max: 98 %   No data recorded   Weight  Min: 102 kg (224 lb 12.8 oz)  Max: 102 kg (224 lb 12.8 oz)     Flowsheet Rows      First Filed Value   Admission Height 170.2 cm (67\") Documented at 02/22/2022 1306   Admission Weight 102 kg (224 lb 12.8 oz) Documented at 02/22/2022 1306             Ejection Fraction  No results found for: EF    Echo EF Estimated  No results found for: ECHOEFEST    Nuclear Stress Ejection Fraction  No components found for: NUCEF    Cath Ejection Fraction Quantitative  No results found for: CATHEF    Physical Exam:     General Appearance:    Alert, cooperative, in no acute distress   Head:    Normocephalic, without obvious abnormality, atraumatic   Eyes:            Lids and lashes normal, conjunctivae and sclerae normal, no   icterus, no pallor, corneas clear, PERRLA   Ears:    Ears appear intact with no abnormalities noted   Throat:   No oral lesions, no thrush, oral mucosa moist   Neck:   No adenopathy, supple, trachea midline, no thyromegaly, no     carotid bruit, no JVD   Back:     No kyphosis present, no scoliosis present, no skin lesions,       erythema or scars, no tenderness to percussion or                   " palpation,   range of motion normal   Lungs:     Clear to auscultation,respirations regular, even and                   unlabored    Heart:    Regular rhythm and normal rate, normal S1 and S2, no            murmur, no gallop, no rub, no click   Breast Exam:    Deferred   Abdomen:     Normal bowel sounds, no masses, no organomegaly, soft        non-tender, non-distended, no guarding, no rebound                 tenderness   Genitalia:    Deferred   Extremities:   Moves all extremities well, no edema, no cyanosis, no              redness   Pulses:   Pulses palpable and equal bilaterally   Skin:   No bleeding, bruising or rash   Lymph nodes:   No palpable adenopathy   Neurologic:   Cranial nerves 2 - 12 grossly intact, sensation intact, DTR        present and equal bilaterally       Results Review:   I reviewed the patient's new clinical results.          No results found for: TROPONINT                    Assessment/Plan       * No active hospital problems. *      Persistent atrial fibrillation now normal sinus rhythm on flecainide    Increase flecainide to 100 mg twice daily and office follow-up in a couple months    I discussed the patients findings and my recommendations with patient    Codey Luther MD  02/22/22  13:56 EST

## 2022-02-23 LAB
QT INTERVAL: 438 MS
QTC INTERVAL: 473 MS

## 2022-08-10 ENCOUNTER — TRANSCRIBE ORDERS (OUTPATIENT)
Dept: ADMINISTRATIVE | Facility: HOSPITAL | Age: 77
End: 2022-08-10

## 2022-08-10 DIAGNOSIS — I49.5 SSS (SICK SINUS SYNDROME): Primary | ICD-10-CM

## 2022-08-24 ENCOUNTER — HOSPITAL ENCOUNTER (OUTPATIENT)
Facility: HOSPITAL | Age: 77
Setting detail: HOSPITAL OUTPATIENT SURGERY
Discharge: HOME | End: 2022-08-24
Attending: INTERNAL MEDICINE | Admitting: INTERNAL MEDICINE

## 2022-08-24 ENCOUNTER — APPOINTMENT (OUTPATIENT)
Dept: GENERAL RADIOLOGY | Facility: HOSPITAL | Age: 77
End: 2022-08-24

## 2022-08-24 VITALS
HEART RATE: 70 BPM | RESPIRATION RATE: 18 BRPM | OXYGEN SATURATION: 98 % | SYSTOLIC BLOOD PRESSURE: 135 MMHG | HEIGHT: 67 IN | DIASTOLIC BLOOD PRESSURE: 75 MMHG | TEMPERATURE: 98.4 F | WEIGHT: 219.9 LBS | BODY MASS INDEX: 34.51 KG/M2

## 2022-08-24 DIAGNOSIS — I49.5 SSS (SICK SINUS SYNDROME): ICD-10-CM

## 2022-08-24 LAB
ANION GAP SERPL CALCULATED.3IONS-SCNC: 9 MMOL/L (ref 5–15)
BUN SERPL-MCNC: 29 MG/DL (ref 8–23)
BUN/CREAT SERPL: 33 (ref 7–25)
CALCIUM SPEC-SCNC: 9.3 MG/DL (ref 8.6–10.5)
CHLORIDE SERPL-SCNC: 100 MMOL/L (ref 98–107)
CO2 SERPL-SCNC: 28 MMOL/L (ref 22–29)
CREAT SERPL-MCNC: 0.88 MG/DL (ref 0.57–1)
DEPRECATED RDW RBC AUTO: 61.1 FL (ref 37–54)
EGFRCR SERPLBLD CKD-EPI 2021: 68.6 ML/MIN/1.73
ERYTHROCYTE [DISTWIDTH] IN BLOOD BY AUTOMATED COUNT: 17 % (ref 12.3–15.4)
GLUCOSE SERPL-MCNC: 114 MG/DL (ref 65–99)
HCT VFR BLD AUTO: 33.2 % (ref 34–46.6)
HGB BLD-MCNC: 11.4 G/DL (ref 12–15.9)
MCH RBC QN AUTO: 33.6 PG (ref 26.6–33)
MCHC RBC AUTO-ENTMCNC: 34.3 G/DL (ref 31.5–35.7)
MCV RBC AUTO: 97.9 FL (ref 79–97)
PLATELET # BLD AUTO: 269 10*3/MM3 (ref 140–450)
PMV BLD AUTO: 9 FL (ref 6–12)
POTASSIUM SERPL-SCNC: 3.6 MMOL/L (ref 3.5–5.2)
RBC # BLD AUTO: 3.39 10*6/MM3 (ref 3.77–5.28)
SODIUM SERPL-SCNC: 137 MMOL/L (ref 136–145)
WBC NRBC COR # BLD: 6.6 10*3/MM3 (ref 3.4–10.8)

## 2022-08-24 PROCEDURE — 85027 COMPLETE CBC AUTOMATED: CPT | Performed by: INTERNAL MEDICINE

## 2022-08-24 PROCEDURE — C1892 INTRO/SHEATH,FIXED,PEEL-AWAY: HCPCS | Performed by: INTERNAL MEDICINE

## 2022-08-24 PROCEDURE — 33208 INSRT HEART PM ATRIAL & VENT: CPT | Performed by: INTERNAL MEDICINE

## 2022-08-24 PROCEDURE — C1785 PMKR, DUAL, RATE-RESP: HCPCS | Performed by: INTERNAL MEDICINE

## 2022-08-24 PROCEDURE — 25010000002 VANCOMYCIN: Performed by: INTERNAL MEDICINE

## 2022-08-24 PROCEDURE — 25010000002 FENTANYL CITRATE (PF) 50 MCG/ML SOLUTION: Performed by: INTERNAL MEDICINE

## 2022-08-24 PROCEDURE — 0 IOPAMIDOL PER 1 ML: Performed by: INTERNAL MEDICINE

## 2022-08-24 PROCEDURE — 25010000002 MIDAZOLAM PER 1 MG: Performed by: INTERNAL MEDICINE

## 2022-08-24 PROCEDURE — C1898 LEAD, PMKR, OTHER THAN TRANS: HCPCS | Performed by: INTERNAL MEDICINE

## 2022-08-24 PROCEDURE — 36415 COLL VENOUS BLD VENIPUNCTURE: CPT

## 2022-08-24 PROCEDURE — 80048 BASIC METABOLIC PNL TOTAL CA: CPT | Performed by: INTERNAL MEDICINE

## 2022-08-24 PROCEDURE — 71045 X-RAY EXAM CHEST 1 VIEW: CPT

## 2022-08-24 DEVICE — LD PM TENDRIL STS 6F52CM 2088TC52: Type: IMPLANTABLE DEVICE | Status: FUNCTIONAL

## 2022-08-24 DEVICE — GEN PM ASSURITY MRI DR RF PM2272: Type: IMPLANTABLE DEVICE | Status: FUNCTIONAL

## 2022-08-24 DEVICE — LD PM TENDRIL STS 6F58CM 2088TC58: Type: IMPLANTABLE DEVICE | Status: FUNCTIONAL

## 2022-08-24 RX ORDER — MORPHINE SULFATE 2 MG/ML
1 INJECTION, SOLUTION INTRAMUSCULAR; INTRAVENOUS EVERY 4 HOURS PRN
Status: DISCONTINUED | OUTPATIENT
Start: 2022-08-24 | End: 2022-08-24 | Stop reason: HOSPADM

## 2022-08-24 RX ORDER — NALOXONE HCL 0.4 MG/ML
0.4 VIAL (ML) INJECTION
Status: DISCONTINUED | OUTPATIENT
Start: 2022-08-24 | End: 2022-08-24 | Stop reason: HOSPADM

## 2022-08-24 RX ORDER — LIDOCAINE HYDROCHLORIDE 10 MG/ML
INJECTION, SOLUTION EPIDURAL; INFILTRATION; INTRACAUDAL; PERINEURAL AS NEEDED
Status: DISCONTINUED | OUTPATIENT
Start: 2022-08-24 | End: 2022-08-24 | Stop reason: HOSPADM

## 2022-08-24 RX ORDER — FENTANYL CITRATE 50 UG/ML
INJECTION, SOLUTION INTRAMUSCULAR; INTRAVENOUS AS NEEDED
Status: DISCONTINUED | OUTPATIENT
Start: 2022-08-24 | End: 2022-08-24 | Stop reason: HOSPADM

## 2022-08-24 RX ORDER — SODIUM CHLORIDE 9 MG/ML
250 INJECTION, SOLUTION INTRAVENOUS CONTINUOUS
Status: ACTIVE | OUTPATIENT
Start: 2022-08-24 | End: 2022-08-24

## 2022-08-24 RX ORDER — MIDAZOLAM HYDROCHLORIDE 1 MG/ML
INJECTION INTRAMUSCULAR; INTRAVENOUS AS NEEDED
Status: DISCONTINUED | OUTPATIENT
Start: 2022-08-24 | End: 2022-08-24 | Stop reason: HOSPADM

## 2022-08-24 RX ADMIN — VANCOMYCIN HYDROCHLORIDE 1500 MG: 10 INJECTION, POWDER, LYOPHILIZED, FOR SOLUTION INTRAVENOUS at 08:57

## 2022-08-24 NOTE — H&P
[facility] History & Physical    Referring Provider: Kevin Howell MD    Patient Care Team:  Joseluis Cunningham MD as PCP - General  Joseluis Cunningham MD as PCP - Family Medicine    Chief complaint fatigue and near syncope.    Subjective .     History of present illness: 76-year-old lady with hypertension and atrial fibrillation who has had previous elective cardioversions was brought to the hospital today for elective pacemaker implantation.  She has a 6-month history of increasing episodes of fatigue dyspnea on exertion and dizzy spells with near syncope.  Outpatient monitoring revealed episodes of atrial fibrillation with predominantly sinus bradycardia with heart rates down to 31 bpm and a maximum heart rate of 82 bpm.  The longest pause was 3.3 seconds.  Patient was felt to require pacemaker implantation and was admitted today for that procedure.  She has no prior history of neo syncope or angina     review of System  A 14 point review of systems was negative except as was stated in the HPI    History  Past Medical History:   Diagnosis Date   • Arthritis    • Hypertension    • PONV (postoperative nausea and vomiting)      Past Surgical History:   Procedure Laterality Date   • BLADDER SURGERY     • CARDIAC SURGERY     • CHOLECYSTECTOMY     • COLONOSCOPY  01/2016    normal   • HYSTERECTOMY     • PACEMAKER IMPLANTATION      08/24/2022 PER DR. HOWELL     Family History   Problem Relation Age of Onset   • Colon polyps Brother    • Colon cancer Neg Hx      Social History     Tobacco Use   • Smoking status: Never Smoker   • Smokeless tobacco: Never Used   Vaping Use   • Vaping Use: Never used   Substance Use Topics   • Alcohol use: Never   • Drug use: Never     Medications Prior to Admission   Medication Sig Dispense Refill Last Dose   • flecainide (TAMBOCOR) 50 MG tablet Take 2 tablets by mouth 2 (Two) Times a Day. 60 tablet 5 8/24/2022 at 0500   • lisinopril-hydrochlorothiazide  "(PRINZIDE,ZESTORETIC) 10-12.5 MG per tablet Take 1 tablet by mouth Daily.   8/24/2022 at 0500   • multivitamin with minerals tablet tablet Take 2 tablets by mouth Daily.   8/23/2022 at Unknown time   • ursodiol (ACTIGALL) 300 MG capsule TAKE 5 CAPSULES BY MOUTH  DAILY (Patient taking differently: Take 1,500 mg by mouth Daily. PT TAKES 3 TABS IN THE AM AND 2 TABS IN THE EVENING.) 150 capsule 11 8/24/2022 at 0500   • Diclofenac Sodium (VOLTAREN) 1 % gel gel APPLY TO THE AFFECTED AREA(S) TWICE A DAY   More than a month at Unknown time   • fluticasone (FLONASE) 50 MCG/ACT nasal spray USE 2 SPRAYS IN EACH NOSTRIL ONCE DAILY - SHAKE BOTTLE GENTLY BEFORE EACH USE   More than a month at Unknown time   • rivaroxaban (XARELTO) 20 MG tablet Take 15 mg by mouth Daily.   8/21/2022     Scheduled Meds:  vancomycin, 15 mg/kg, Intravenous, Once      Continuous Infusions:     PRN Meds:    Allergies:  Ceftriaxone and Latex    Objective     Vital Sign Min/Max for last 24 hours  Temp  Min: 98.4 °F (36.9 °C)  Max: 98.4 °F (36.9 °C)   BP  Min: 174/76  Max: 176/70   Pulse  Min: 63  Max: 63   Resp  Min: 18  Max: 18   SpO2  Min: 98 %  Max: 99 %   No data recorded   Weight  Min: 99.7 kg (219 lb 14.4 oz)  Max: 99.7 kg (219 lb 14.4 oz)     Flowsheet Rows    Flowsheet Row First Filed Value   Admission Height 170.2 cm (67\") Documented at 08/24/2022 0639   Admission Weight 99.7 kg (219 lb 14.4 oz) Documented at 08/24/2022 0639             Physical Exam:  General Appearance: Alert, appears stated age and cooperative  Lungs: Clear to ascultation  Heart:: RRR   No Murmurs, Rubs or Gallops  Abdomen: Soft and nontender with adequate bowel sounds.  No organomegaly  Extremities: No cyanosis, clubbing or edema  Pulses: Pulses palpable and equal bilaterally  Skin: Warm and dry with no rash  Psych: Normal    Results Review:   I reviewed the patient's new clinical results.  Results from last 7 days   Lab Units 08/24/22  0643   WBC 10*3/mm3 6.60   HEMOGLOBIN " g/dL 11.4*   HEMATOCRIT % 33.2*   PLATELETS 10*3/mm3 269         No results found for: TROPONINT                      SSS (sick sinus syndrome) (HCC)      Impression      Sick sinus syndrome with symptomatic bradycardia  Paroxysmal atrial fibrillation        Plan       Dual-chamber pacemaker implantation today.      I discussed the patients findings and my recommendations with patient    Kevin Howell MD   08/24/22  07:14 EDT

## 2022-08-25 ENCOUNTER — CALL CENTER PROGRAMS (OUTPATIENT)
Dept: CALL CENTER | Facility: HOSPITAL | Age: 77
End: 2022-08-25

## 2022-08-25 NOTE — OUTREACH NOTE
PCI/Device Survey    Flowsheet Row Responses   Facility patient discharged from? [facility]   Procedure date 08/24/22   Procedure (if device, specify in description) Device   Device Description Pacemaker DC new 86589   PCI site --  [chest]   Performing MD Dr. Kevin Howell   Attempt successful? Yes   Call start time 0957   Call end time 1001   Has the patient had any of the following symptoms since discharge? --  [none ]   Nursing interventions Patient education provided   Is the patient taking prescribed medications: N/A  [no changes to meds, resume home meds]   Nursing intervention Reminded to continue to take prescribed medications, Nurse provided patient education   Does the patient have any of the following symptoms related to the cath/surgical site? --  [no issues]   Nursing intervention Patient education provided   Does the patient have an appointment scheduled with the cardiologist? No   Appointment comments Awaiting a return call from cardiology [facility].   If the patient is a current smoker, are they able to teach back resources for cessation? Not a smoker   Did the patient feel prepared to go home on the same day as the procedure? Yes   Is the patient satisfied with the same day discharge process? Yes   PCI/Device call completed Yes   Wrap up additional comments No issues or concerns., Reviewed d/c instructions.           MAMTA TRINIDAD - Registered Nurse

## 2023-01-06 RX ORDER — URSODIOL 300 MG/1
1500 CAPSULE ORAL DAILY
Qty: 150 CAPSULE | Refills: 0 | Status: SHIPPED | OUTPATIENT
Start: 2023-01-06

## 2023-01-06 NOTE — TELEPHONE ENCOUNTER
Rx Refill Note  Requested Prescriptions     Pending Prescriptions Disp Refills   • ursodiol (ACTIGALL) 300 MG capsule [Pharmacy Med Name: ursodiol 300 mg capsule] 150 capsule 11     Sig: TAKE 5 CAPSULES BY MOUTH ONCE DAILY      Last office visit with prescribing clinician: 10/19/2021   Last telemedicine visit with prescribing clinician: Visit date not found   Next office visit with prescribing clinician: Visit date not found                         Would you like a call back once the refill request has been completed: [] Yes [] No    If the office needs to give you a call back, can they leave a voicemail: [] Yes [] No    Betty Sullivan LPN  01/06/23, 09:10 EST

## 2023-05-03 ENCOUNTER — TELEPHONE (OUTPATIENT)
Dept: GASTROENTEROLOGY | Facility: CLINIC | Age: 78
End: 2023-05-03
Payer: MEDICARE

## 2023-05-03 NOTE — TELEPHONE ENCOUNTER
Medical records request. Sent upon receipt [facility] [address] [address] phone [phone]

## 2023-12-27 ENCOUNTER — APPOINTMENT (OUTPATIENT)
Dept: GENERAL RADIOLOGY | Facility: HOSPITAL | Age: 78
End: 2023-12-27
Payer: MEDICARE

## 2023-12-27 ENCOUNTER — HOSPITAL ENCOUNTER (OUTPATIENT)
Facility: HOSPITAL | Age: 78
Setting detail: OBSERVATION
Discharge: HOME | End: 2023-12-29
Attending: STUDENT IN AN ORGANIZED HEALTH CARE EDUCATION/TRAINING PROGRAM | Admitting: INTERNAL MEDICINE
Payer: MEDICARE

## 2023-12-27 DIAGNOSIS — I49.9 VENTRICULAR ARRHYTHMIA: Primary | ICD-10-CM

## 2023-12-27 LAB
ALBUMIN SERPL-MCNC: 3.9 G/DL (ref 3.5–5.2)
ALBUMIN/GLOB SERPL: 0.9 G/DL
ALP SERPL-CCNC: 192 U/L (ref 39–117)
ALT SERPL W P-5'-P-CCNC: 10 U/L (ref 1–33)
ANION GAP SERPL CALCULATED.3IONS-SCNC: 8 MMOL/L (ref 5–15)
AST SERPL-CCNC: 23 U/L (ref 1–32)
BASOPHILS # BLD AUTO: 0.04 10*3/MM3 (ref 0–0.2)
BASOPHILS NFR BLD AUTO: 0.5 % (ref 0–1.5)
BILIRUB SERPL-MCNC: 0.7 MG/DL (ref 0–1.2)
BILIRUB UR QL STRIP: NEGATIVE
BUN SERPL-MCNC: 21 MG/DL (ref 8–23)
BUN/CREAT SERPL: 20.2 (ref 7–25)
CALCIUM SPEC-SCNC: 9 MG/DL (ref 8.6–10.5)
CHLORIDE SERPL-SCNC: 99 MMOL/L (ref 98–107)
CLARITY UR: ABNORMAL
CO2 SERPL-SCNC: 31 MMOL/L (ref 22–29)
COLOR UR: YELLOW
CREAT SERPL-MCNC: 1.04 MG/DL (ref 0.57–1)
D-LACTATE SERPL-SCNC: 1.1 MMOL/L (ref 0.5–2)
DEPRECATED RDW RBC AUTO: 49.1 FL (ref 37–54)
EGFRCR SERPLBLD CKD-EPI 2021: 55.5 ML/MIN/1.73
EOSINOPHIL # BLD AUTO: 0.22 10*3/MM3 (ref 0–0.4)
EOSINOPHIL NFR BLD AUTO: 2.6 % (ref 0.3–6.2)
ERYTHROCYTE [DISTWIDTH] IN BLOOD BY AUTOMATED COUNT: 14.4 % (ref 12.3–15.4)
FLUAV RNA RESP QL NAA+PROBE: NOT DETECTED
FLUBV RNA RESP QL NAA+PROBE: NOT DETECTED
GEN 5 2HR TROPONIN T REFLEX: 23 NG/L
GLOBULIN UR ELPH-MCNC: 4.2 GM/DL
GLUCOSE SERPL-MCNC: 110 MG/DL (ref 65–99)
GLUCOSE UR STRIP-MCNC: NEGATIVE MG/DL
HCT VFR BLD AUTO: 35.1 % (ref 34–46.6)
HGB BLD-MCNC: 11.4 G/DL (ref 12–15.9)
HGB UR QL STRIP.AUTO: NEGATIVE
HOLD SPECIMEN: NORMAL
HOLD SPECIMEN: NORMAL
IMM GRANULOCYTES # BLD AUTO: 0.02 10*3/MM3 (ref 0–0.05)
IMM GRANULOCYTES NFR BLD AUTO: 0.2 % (ref 0–0.5)
KETONES UR QL STRIP: NEGATIVE
LEUKOCYTE ESTERASE UR QL STRIP.AUTO: NEGATIVE
LYMPHOCYTES # BLD AUTO: 2.3 10*3/MM3 (ref 0.7–3.1)
LYMPHOCYTES NFR BLD AUTO: 26.8 % (ref 19.6–45.3)
MAGNESIUM SERPL-MCNC: 1.8 MG/DL (ref 1.6–2.4)
MCH RBC QN AUTO: 30.1 PG (ref 26.6–33)
MCHC RBC AUTO-ENTMCNC: 32.5 G/DL (ref 31.5–35.7)
MCV RBC AUTO: 92.6 FL (ref 79–97)
MONOCYTES # BLD AUTO: 0.67 10*3/MM3 (ref 0.1–0.9)
MONOCYTES NFR BLD AUTO: 7.8 % (ref 5–12)
NEUTROPHILS NFR BLD AUTO: 5.32 10*3/MM3 (ref 1.7–7)
NEUTROPHILS NFR BLD AUTO: 62.1 % (ref 42.7–76)
NITRITE UR QL STRIP: NEGATIVE
NRBC BLD AUTO-RTO: 0 /100 WBC (ref 0–0.2)
PH UR STRIP.AUTO: 6.5 [PH] (ref 5–8)
PLATELET # BLD AUTO: 295 10*3/MM3 (ref 140–450)
PMV BLD AUTO: 9.4 FL (ref 6–12)
POTASSIUM SERPL-SCNC: 2.8 MMOL/L (ref 3.5–5.2)
PROT SERPL-MCNC: 8.1 G/DL (ref 6–8.5)
PROT UR QL STRIP: NEGATIVE
RBC # BLD AUTO: 3.79 10*6/MM3 (ref 3.77–5.28)
SARS-COV-2 RNA RESP QL NAA+PROBE: NOT DETECTED
SODIUM SERPL-SCNC: 138 MMOL/L (ref 136–145)
SP GR UR STRIP: 1.01 (ref 1–1.03)
TROPONIN T DELTA: -5 NG/L
TROPONIN T SERPL HS-MCNC: 28 NG/L
UROBILINOGEN UR QL STRIP: ABNORMAL
WBC NRBC COR # BLD AUTO: 8.57 10*3/MM3 (ref 3.4–10.8)
WHOLE BLOOD HOLD COAG: NORMAL
WHOLE BLOOD HOLD SPECIMEN: NORMAL

## 2023-12-27 PROCEDURE — 84484 ASSAY OF TROPONIN QUANT: CPT | Performed by: STUDENT IN AN ORGANIZED HEALTH CARE EDUCATION/TRAINING PROGRAM

## 2023-12-27 PROCEDURE — 93010 ELECTROCARDIOGRAM REPORT: CPT | Performed by: INTERNAL MEDICINE

## 2023-12-27 PROCEDURE — 87040 BLOOD CULTURE FOR BACTERIA: CPT | Performed by: STUDENT IN AN ORGANIZED HEALTH CARE EDUCATION/TRAINING PROGRAM

## 2023-12-27 PROCEDURE — 80053 COMPREHEN METABOLIC PANEL: CPT | Performed by: STUDENT IN AN ORGANIZED HEALTH CARE EDUCATION/TRAINING PROGRAM

## 2023-12-27 PROCEDURE — G0378 HOSPITAL OBSERVATION PER HR: HCPCS

## 2023-12-27 PROCEDURE — 87636 SARSCOV2 & INF A&B AMP PRB: CPT | Performed by: STUDENT IN AN ORGANIZED HEALTH CARE EDUCATION/TRAINING PROGRAM

## 2023-12-27 PROCEDURE — 83605 ASSAY OF LACTIC ACID: CPT | Performed by: STUDENT IN AN ORGANIZED HEALTH CARE EDUCATION/TRAINING PROGRAM

## 2023-12-27 PROCEDURE — 81003 URINALYSIS AUTO W/O SCOPE: CPT | Performed by: STUDENT IN AN ORGANIZED HEALTH CARE EDUCATION/TRAINING PROGRAM

## 2023-12-27 PROCEDURE — 83735 ASSAY OF MAGNESIUM: CPT | Performed by: STUDENT IN AN ORGANIZED HEALTH CARE EDUCATION/TRAINING PROGRAM

## 2023-12-27 PROCEDURE — 71045 X-RAY EXAM CHEST 1 VIEW: CPT | Performed by: RADIOLOGY

## 2023-12-27 PROCEDURE — 36415 COLL VENOUS BLD VENIPUNCTURE: CPT

## 2023-12-27 PROCEDURE — 84443 ASSAY THYROID STIM HORMONE: CPT | Performed by: INTERNAL MEDICINE

## 2023-12-27 PROCEDURE — 93005 ELECTROCARDIOGRAM TRACING: CPT | Performed by: STUDENT IN AN ORGANIZED HEALTH CARE EDUCATION/TRAINING PROGRAM

## 2023-12-27 PROCEDURE — 83880 ASSAY OF NATRIURETIC PEPTIDE: CPT | Performed by: STUDENT IN AN ORGANIZED HEALTH CARE EDUCATION/TRAINING PROGRAM

## 2023-12-27 PROCEDURE — 71045 X-RAY EXAM CHEST 1 VIEW: CPT

## 2023-12-27 PROCEDURE — 85025 COMPLETE CBC W/AUTO DIFF WBC: CPT | Performed by: STUDENT IN AN ORGANIZED HEALTH CARE EDUCATION/TRAINING PROGRAM

## 2023-12-27 PROCEDURE — 99284 EMERGENCY DEPT VISIT MOD MDM: CPT

## 2023-12-27 RX ORDER — SODIUM CHLORIDE 0.9 % (FLUSH) 0.9 %
10 SYRINGE (ML) INJECTION AS NEEDED
Status: DISCONTINUED | OUTPATIENT
Start: 2023-12-27 | End: 2023-12-29 | Stop reason: HOSPADM

## 2023-12-27 RX ORDER — POTASSIUM CHLORIDE 20 MEQ/1
40 TABLET, EXTENDED RELEASE ORAL ONCE
Status: COMPLETED | OUTPATIENT
Start: 2023-12-27 | End: 2023-12-27

## 2023-12-27 RX ADMIN — POTASSIUM CHLORIDE 40 MEQ: 1500 TABLET, EXTENDED RELEASE ORAL at 21:15

## 2023-12-28 ENCOUNTER — APPOINTMENT (OUTPATIENT)
Dept: CARDIOLOGY | Facility: HOSPITAL | Age: 78
End: 2023-12-28
Payer: MEDICARE

## 2023-12-28 PROBLEM — E87.6 HYPOKALEMIA: Status: ACTIVE | Noted: 2023-12-28

## 2023-12-28 LAB
ANION GAP SERPL CALCULATED.3IONS-SCNC: 10 MMOL/L (ref 5–15)
ANION GAP SERPL CALCULATED.3IONS-SCNC: 5 MMOL/L (ref 5–15)
BASOPHILS # BLD AUTO: 0.05 10*3/MM3 (ref 0–0.2)
BASOPHILS NFR BLD AUTO: 0.7 % (ref 0–1.5)
BH CV ECHO MEAS - ACS: 1.6 CM
BH CV ECHO MEAS - AI P1/2T: 705.5 MSEC
BH CV ECHO MEAS - AO MAX PG: 11.6 MMHG
BH CV ECHO MEAS - AO MEAN PG: 7 MMHG
BH CV ECHO MEAS - AO ROOT DIAM: 3.1 CM
BH CV ECHO MEAS - AO V2 MAX: 170 CM/SEC
BH CV ECHO MEAS - AO V2 VTI: 31.8 CM
BH CV ECHO MEAS - EDV(CUBED): 161.9 ML
BH CV ECHO MEAS - EDV(MOD-SP4): 73.3 ML
BH CV ECHO MEAS - EF(MOD-SP4): 65.1 %
BH CV ECHO MEAS - ESV(CUBED): 44.7 ML
BH CV ECHO MEAS - ESV(MOD-SP4): 25.6 ML
BH CV ECHO MEAS - FS: 34.9 %
BH CV ECHO MEAS - IVS/LVPW: 0.91 CM
BH CV ECHO MEAS - IVSD: 1.05 CM
BH CV ECHO MEAS - LA DIMENSION: 5 CM
BH CV ECHO MEAS - LAT PEAK E' VEL: 13.2 CM/SEC
BH CV ECHO MEAS - LV DIASTOLIC VOL/BSA (35-75): 34.9 CM2
BH CV ECHO MEAS - LV MASS(C)D: 238.4 GRAMS
BH CV ECHO MEAS - LV SYSTOLIC VOL/BSA (12-30): 12.2 CM2
BH CV ECHO MEAS - LVIDD: 5.5 CM
BH CV ECHO MEAS - LVIDS: 3.6 CM
BH CV ECHO MEAS - LVOT AREA: 3.8 CM2
BH CV ECHO MEAS - LVOT DIAM: 2.2 CM
BH CV ECHO MEAS - LVPWD: 1.15 CM
BH CV ECHO MEAS - MED PEAK E' VEL: 6.5 CM/SEC
BH CV ECHO MEAS - MV E MAX VEL: 94.8 CM/SEC
BH CV ECHO MEAS - PA ACC TIME: 0.1 SEC
BH CV ECHO MEAS - RAP SYSTOLE: 10 MMHG
BH CV ECHO MEAS - RVSP: 46 MMHG
BH CV ECHO MEAS - SI(MOD-SP4): 22.7 ML/M2
BH CV ECHO MEAS - SV(MOD-SP4): 47.7 ML
BH CV ECHO MEAS - TAPSE (>1.6): 2.14 CM
BH CV ECHO MEAS - TR MAX PG: 36 MMHG
BH CV ECHO MEAS - TR MAX VEL: 300 CM/SEC
BH CV ECHO MEASUREMENTS AVERAGE E/E' RATIO: 9.62
BUN SERPL-MCNC: 17 MG/DL (ref 8–23)
BUN SERPL-MCNC: 19 MG/DL (ref 8–23)
BUN/CREAT SERPL: 17.3 (ref 7–25)
BUN/CREAT SERPL: 22.1 (ref 7–25)
CALCIUM SPEC-SCNC: 8.8 MG/DL (ref 8.6–10.5)
CALCIUM SPEC-SCNC: 9 MG/DL (ref 8.6–10.5)
CHLORIDE SERPL-SCNC: 101 MMOL/L (ref 98–107)
CHLORIDE SERPL-SCNC: 102 MMOL/L (ref 98–107)
CO2 SERPL-SCNC: 29 MMOL/L (ref 22–29)
CO2 SERPL-SCNC: 30 MMOL/L (ref 22–29)
CREAT SERPL-MCNC: 0.86 MG/DL (ref 0.57–1)
CREAT SERPL-MCNC: 0.98 MG/DL (ref 0.57–1)
DEPRECATED RDW RBC AUTO: 48.5 FL (ref 37–54)
EGFRCR SERPLBLD CKD-EPI 2021: 59.6 ML/MIN/1.73
EGFRCR SERPLBLD CKD-EPI 2021: 69.7 ML/MIN/1.73
EOSINOPHIL # BLD AUTO: 0.21 10*3/MM3 (ref 0–0.4)
EOSINOPHIL NFR BLD AUTO: 3 % (ref 0.3–6.2)
ERYTHROCYTE [DISTWIDTH] IN BLOOD BY AUTOMATED COUNT: 14.4 % (ref 12.3–15.4)
GLUCOSE SERPL-MCNC: 111 MG/DL (ref 65–99)
GLUCOSE SERPL-MCNC: 132 MG/DL (ref 65–99)
HCT VFR BLD AUTO: 32.3 % (ref 34–46.6)
HGB BLD-MCNC: 10.6 G/DL (ref 12–15.9)
IMM GRANULOCYTES # BLD AUTO: 0.01 10*3/MM3 (ref 0–0.05)
IMM GRANULOCYTES NFR BLD AUTO: 0.1 % (ref 0–0.5)
LEFT ATRIUM VOLUME INDEX: 43 ML/M2
LYMPHOCYTES # BLD AUTO: 1.98 10*3/MM3 (ref 0.7–3.1)
LYMPHOCYTES NFR BLD AUTO: 28.2 % (ref 19.6–45.3)
MAGNESIUM SERPL-MCNC: 2.1 MG/DL (ref 1.6–2.4)
MAGNESIUM SERPL-MCNC: 2.2 MG/DL (ref 1.6–2.4)
MCH RBC QN AUTO: 30 PG (ref 26.6–33)
MCHC RBC AUTO-ENTMCNC: 32.8 G/DL (ref 31.5–35.7)
MCV RBC AUTO: 91.5 FL (ref 79–97)
MONOCYTES # BLD AUTO: 0.64 10*3/MM3 (ref 0.1–0.9)
MONOCYTES NFR BLD AUTO: 9.1 % (ref 5–12)
NEUTROPHILS NFR BLD AUTO: 4.12 10*3/MM3 (ref 1.7–7)
NEUTROPHILS NFR BLD AUTO: 58.9 % (ref 42.7–76)
NRBC BLD AUTO-RTO: 0 /100 WBC (ref 0–0.2)
NT-PROBNP SERPL-MCNC: 2918 PG/ML (ref 0–1800)
PHOSPHATE SERPL-MCNC: 2.3 MG/DL (ref 2.5–4.5)
PLATELET # BLD AUTO: 278 10*3/MM3 (ref 140–450)
PMV BLD AUTO: 9.5 FL (ref 6–12)
POTASSIUM SERPL-SCNC: 3 MMOL/L (ref 3.5–5.2)
POTASSIUM SERPL-SCNC: 3.9 MMOL/L (ref 3.5–5.2)
QT INTERVAL: 386 MS
QT INTERVAL: 388 MS
QT INTERVAL: 410 MS
QT INTERVAL: 458 MS
QTC INTERVAL: 467 MS
QTC INTERVAL: 469 MS
QTC INTERVAL: 472 MS
QTC INTERVAL: 551 MS
RBC # BLD AUTO: 3.53 10*6/MM3 (ref 3.77–5.28)
SODIUM SERPL-SCNC: 136 MMOL/L (ref 136–145)
SODIUM SERPL-SCNC: 141 MMOL/L (ref 136–145)
TSH SERPL DL<=0.05 MIU/L-ACNC: 1.63 UIU/ML (ref 0.27–4.2)
WBC NRBC COR # BLD AUTO: 7.01 10*3/MM3 (ref 3.4–10.8)

## 2023-12-28 PROCEDURE — 93010 ELECTROCARDIOGRAM REPORT: CPT | Performed by: INTERNAL MEDICINE

## 2023-12-28 PROCEDURE — 93306 TTE W/DOPPLER COMPLETE: CPT

## 2023-12-28 PROCEDURE — G0378 HOSPITAL OBSERVATION PER HR: HCPCS

## 2023-12-28 PROCEDURE — 99222 1ST HOSP IP/OBS MODERATE 55: CPT | Performed by: INTERNAL MEDICINE

## 2023-12-28 PROCEDURE — 25010000002 MAGNESIUM SULFATE 2 GM/50ML SOLUTION: Performed by: STUDENT IN AN ORGANIZED HEALTH CARE EDUCATION/TRAINING PROGRAM

## 2023-12-28 PROCEDURE — 93306 TTE W/DOPPLER COMPLETE: CPT | Performed by: INTERNAL MEDICINE

## 2023-12-28 PROCEDURE — 99204 OFFICE O/P NEW MOD 45 MIN: CPT | Performed by: INTERNAL MEDICINE

## 2023-12-28 PROCEDURE — 83735 ASSAY OF MAGNESIUM: CPT | Performed by: INTERNAL MEDICINE

## 2023-12-28 PROCEDURE — 85025 COMPLETE CBC W/AUTO DIFF WBC: CPT | Performed by: INTERNAL MEDICINE

## 2023-12-28 PROCEDURE — 84100 ASSAY OF PHOSPHORUS: CPT | Performed by: STUDENT IN AN ORGANIZED HEALTH CARE EDUCATION/TRAINING PROGRAM

## 2023-12-28 PROCEDURE — 80048 BASIC METABOLIC PNL TOTAL CA: CPT | Performed by: INTERNAL MEDICINE

## 2023-12-28 PROCEDURE — 96365 THER/PROPH/DIAG IV INF INIT: CPT

## 2023-12-28 PROCEDURE — 97161 PT EVAL LOW COMPLEX 20 MIN: CPT

## 2023-12-28 PROCEDURE — 93005 ELECTROCARDIOGRAM TRACING: CPT | Performed by: INTERNAL MEDICINE

## 2023-12-28 PROCEDURE — 93005 ELECTROCARDIOGRAM TRACING: CPT | Performed by: STUDENT IN AN ORGANIZED HEALTH CARE EDUCATION/TRAINING PROGRAM

## 2023-12-28 RX ORDER — SODIUM CHLORIDE 9 MG/ML
100 INJECTION, SOLUTION INTRAVENOUS CONTINUOUS
Status: DISCONTINUED | OUTPATIENT
Start: 2023-12-28 | End: 2023-12-28

## 2023-12-28 RX ORDER — MAGNESIUM SULFATE HEPTAHYDRATE 40 MG/ML
2 INJECTION, SOLUTION INTRAVENOUS ONCE
Status: COMPLETED | OUTPATIENT
Start: 2023-12-28 | End: 2023-12-28

## 2023-12-28 RX ORDER — URSODIOL 300 MG/1
600 CAPSULE ORAL EVERY EVENING
Status: DISCONTINUED | OUTPATIENT
Start: 2023-12-28 | End: 2023-12-29 | Stop reason: HOSPADM

## 2023-12-28 RX ORDER — SODIUM CHLORIDE 0.9 % (FLUSH) 0.9 %
10 SYRINGE (ML) INJECTION EVERY 12 HOURS SCHEDULED
Status: DISCONTINUED | OUTPATIENT
Start: 2023-12-28 | End: 2023-12-29 | Stop reason: HOSPADM

## 2023-12-28 RX ORDER — AMOXICILLIN 250 MG
2 CAPSULE ORAL 2 TIMES DAILY
Status: DISCONTINUED | OUTPATIENT
Start: 2023-12-28 | End: 2023-12-29 | Stop reason: HOSPADM

## 2023-12-28 RX ORDER — URSODIOL 300 MG/1
900 CAPSULE ORAL EVERY MORNING
COMMUNITY

## 2023-12-28 RX ORDER — HEPARIN SODIUM 5000 [USP'U]/ML
5000 INJECTION, SOLUTION INTRAVENOUS; SUBCUTANEOUS EVERY 12 HOURS SCHEDULED
Status: DISCONTINUED | OUTPATIENT
Start: 2023-12-28 | End: 2023-12-28

## 2023-12-28 RX ORDER — LISINOPRIL AND HYDROCHLOROTHIAZIDE 12.5; 1 MG/1; MG/1
1 TABLET ORAL 2 TIMES DAILY
COMMUNITY

## 2023-12-28 RX ORDER — FLECAINIDE ACETATE 50 MG/1
100 TABLET ORAL 2 TIMES DAILY
Status: DISCONTINUED | OUTPATIENT
Start: 2023-12-28 | End: 2023-12-28

## 2023-12-28 RX ORDER — LISINOPRIL 10 MG/1
10 TABLET ORAL
Status: DISCONTINUED | OUTPATIENT
Start: 2023-12-28 | End: 2023-12-29 | Stop reason: HOSPADM

## 2023-12-28 RX ORDER — POLYETHYLENE GLYCOL 3350 17 G/17G
17 POWDER, FOR SOLUTION ORAL DAILY PRN
Status: DISCONTINUED | OUTPATIENT
Start: 2023-12-28 | End: 2023-12-29 | Stop reason: HOSPADM

## 2023-12-28 RX ORDER — BISACODYL 5 MG/1
5 TABLET, DELAYED RELEASE ORAL DAILY PRN
Status: DISCONTINUED | OUTPATIENT
Start: 2023-12-28 | End: 2023-12-29 | Stop reason: HOSPADM

## 2023-12-28 RX ORDER — POTASSIUM CHLORIDE 20 MEQ/1
40 TABLET, EXTENDED RELEASE ORAL EVERY 4 HOURS
Status: COMPLETED | OUTPATIENT
Start: 2023-12-28 | End: 2023-12-28

## 2023-12-28 RX ORDER — SODIUM CHLORIDE 9 MG/ML
40 INJECTION, SOLUTION INTRAVENOUS AS NEEDED
Status: DISCONTINUED | OUTPATIENT
Start: 2023-12-28 | End: 2023-12-29 | Stop reason: HOSPADM

## 2023-12-28 RX ORDER — URSODIOL 300 MG/1
600 CAPSULE ORAL EVERY EVENING
COMMUNITY

## 2023-12-28 RX ORDER — SODIUM CHLORIDE 0.9 % (FLUSH) 0.9 %
10 SYRINGE (ML) INJECTION AS NEEDED
Status: DISCONTINUED | OUTPATIENT
Start: 2023-12-28 | End: 2023-12-29 | Stop reason: HOSPADM

## 2023-12-28 RX ORDER — HYDROCHLOROTHIAZIDE 25 MG/1
12.5 TABLET ORAL
Status: DISCONTINUED | OUTPATIENT
Start: 2023-12-28 | End: 2023-12-29 | Stop reason: HOSPADM

## 2023-12-28 RX ORDER — NITROGLYCERIN 0.4 MG/1
0.4 TABLET SUBLINGUAL
Status: DISCONTINUED | OUTPATIENT
Start: 2023-12-28 | End: 2023-12-29 | Stop reason: HOSPADM

## 2023-12-28 RX ORDER — FLECAINIDE ACETATE 100 MG/1
100 TABLET ORAL 2 TIMES DAILY
COMMUNITY
End: 2023-12-29 | Stop reason: HOSPADM

## 2023-12-28 RX ORDER — BISACODYL 10 MG
10 SUPPOSITORY, RECTAL RECTAL DAILY PRN
Status: DISCONTINUED | OUTPATIENT
Start: 2023-12-28 | End: 2023-12-29 | Stop reason: HOSPADM

## 2023-12-28 RX ORDER — URSODIOL 300 MG/1
900 CAPSULE ORAL DAILY
Status: DISCONTINUED | OUTPATIENT
Start: 2023-12-28 | End: 2023-12-29 | Stop reason: HOSPADM

## 2023-12-28 RX ORDER — METOPROLOL SUCCINATE 25 MG/1
12.5 TABLET, EXTENDED RELEASE ORAL
Status: DISCONTINUED | OUTPATIENT
Start: 2023-12-28 | End: 2023-12-29

## 2023-12-28 RX ADMIN — URSODIOL 900 MG: 300 CAPSULE ORAL at 10:10

## 2023-12-28 RX ADMIN — POTASSIUM CHLORIDE 40 MEQ: 1500 TABLET, EXTENDED RELEASE ORAL at 08:56

## 2023-12-28 RX ADMIN — HYDROCHLOROTHIAZIDE 12.5 MG: 25 TABLET ORAL at 10:10

## 2023-12-28 RX ADMIN — DOCUSATE SODIUM 50 MG AND SENNOSIDES 8.6 MG 2 TABLET: 8.6; 5 TABLET, FILM COATED ORAL at 20:26

## 2023-12-28 RX ADMIN — DICLOFENAC SODIUM 4 G: 10 GEL TOPICAL at 10:10

## 2023-12-28 RX ADMIN — MAGNESIUM SULFATE HEPTAHYDRATE 2 G: 40 INJECTION, SOLUTION INTRAVENOUS at 00:28

## 2023-12-28 RX ADMIN — URSODIOL 600 MG: 300 CAPSULE ORAL at 17:17

## 2023-12-28 RX ADMIN — RIVAROXABAN 15 MG: 15 TABLET, FILM COATED ORAL at 17:17

## 2023-12-28 RX ADMIN — DICLOFENAC SODIUM 4 G: 10 GEL TOPICAL at 20:26

## 2023-12-28 RX ADMIN — METOPROLOL SUCCINATE 12.5 MG: 25 TABLET, EXTENDED RELEASE ORAL at 17:17

## 2023-12-28 RX ADMIN — POTASSIUM CHLORIDE 40 MEQ: 1500 TABLET, EXTENDED RELEASE ORAL at 04:53

## 2023-12-28 RX ADMIN — FLECAINIDE ACETATE 100 MG: 50 TABLET ORAL at 10:10

## 2023-12-28 RX ADMIN — POTASSIUM CHLORIDE 40 MEQ: 1500 TABLET, EXTENDED RELEASE ORAL at 13:32

## 2023-12-28 RX ADMIN — Medication 10 ML: at 08:56

## 2023-12-28 RX ADMIN — POTASSIUM CHLORIDE 40 MEQ: 1500 TABLET, EXTENDED RELEASE ORAL at 17:17

## 2023-12-28 RX ADMIN — LISINOPRIL 10 MG: 10 TABLET ORAL at 10:10

## 2023-12-28 NOTE — THERAPY EVALUATION
Acute Care - Physical Therapy Initial Evaluation   [place]     Patient Name: Radha Negrete  : 1945  MRN: [id number]  Today's Date: 2023      Visit Dx:     ICD-10-CM ICD-9-CM   1. Ventricular arrhythmia  I49.9 427.9     Patient Active Problem List   Diagnosis    Longstanding persistent atrial fibrillation    Encounter for colorectal cancer screening    SSS (sick sinus syndrome)    Hypokalemia     Past Medical History:   Diagnosis Date    Arthritis     Hypertension     PONV (postoperative nausea and vomiting)      Past Surgical History:   Procedure Laterality Date    BLADDER SURGERY      CARDIAC ELECTROPHYSIOLOGY PROCEDURE N/A 2022    Procedure: Pacemaker DC new 37816;  Surgeon: Kevin Howell MD;  Location:  [facility] INVASIVE LOCATION;  Service: Cardiology;  Laterality: N/A;    CARDIAC SURGERY      CARDIOVERSION      21,9/3/21,22    CHOLECYSTECTOMY      COLONOSCOPY  2016    normal    HYSTERECTOMY      PACEMAKER IMPLANTATION      2022 PER DR. HOWELL     PT Assessment (last 12 hours)       PT Evaluation and Treatment       Row Name 23 1311          Physical Therapy Time and Intention    Subjective Information no complaints  -KM     Document Type evaluation  -KM     Mode of Treatment individual therapy;physical therapy  -KM     Patient Effort good  -KM       Row Name 23 1311          General Information    Patient Profile Reviewed yes  -KM     Patient Observations alert;cooperative;agree to therapy  -KM     Prior Level of Function independent:;all household mobility;ADL's  -KM     Existing Precautions/Restrictions no known precautions/restrictions  -KM     Risks Reviewed patient:;LOB;nausea/vomiting;dizziness;increased discomfort  -KM     Benefits Reviewed patient:;improve function;increase independence;increase strength;increase balance  -KM     Barriers to Rehab none identified  -KM       Row Name 23 1311          Living Environment    Current  Living Arrangements home  -KM     People in Home spouse  -KM     Primary Care Provided by self  -KM       Row Name 12/28/23 1311          Home Use of Assistive/Adaptive Equipment    Equipment Currently Used at Home none  -KM       Row Name 12/28/23 1311          Cognition    Affect/Mental Status (Cognition) WFL  -KM     Orientation Status (Cognition) oriented x 4  -KM     Follows Commands (Cognition) WFL  -KM       Row Name 12/28/23 1311          Range of Motion (ROM)    Range of Motion bilateral lower extremities;ROM is [facility]  -KM       Row Name 12/28/23 1311          Strength (Manual Muscle Testing)    Strength (Manual Muscle Testing) bilateral lower extremities;strength is [facility]  -       Row Name 12/28/23 1311          Bed Mobility    Bed Mobility bed mobility (all) activities  -KM     All Activities, [place] (Bed Mobility) modified independence  -KM     Assistive Device (Bed Mobility) bed rails  -KM       Row Name 12/28/23 1311          Transfers    Transfers sit-stand transfer;stand-sit transfer  -KM       Row Name 12/28/23 1311          Sit-Stand Transfer    Sit-Stand [place] (Transfers) supervision  -KM       Row Name 12/28/23 1311          Stand-Sit Transfer    Stand-Sit [place] (Transfers) supervision  -KM       Row Name 12/28/23 1311          Gait/Stairs (Locomotion)    Gait/Stairs Locomotion gait/ambulation independence;distance ambulated  -KM     [place] Level (Gait) supervision  -KM     Patient was able to Ambulate yes  -KM     Distance in Feet (Gait) 300  -KM     Pattern (Gait) step-through  -KM       Row Name 12/28/23 1311          Balance    Balance Assessment sitting static balance;standing dynamic balance  -KM     Static Sitting Balance independent  -KM     Position, Sitting Balance sitting edge of bed  -KM     Dynamic Standing Balance supervision  -KM       Row Name             [REMOVED] Wound 08/24/22 0914 Left lower chest Incision    Wound - Properties Group Placement Date:  08/24/22  -MV Placement Time: 0914  -MV Present on Original Admission: N  -MV Side: Left  -MV Orientation: lower  -MV Location: chest  -MV Primary Wound Type: Incision  -MV Removal Date: 12/28/23  -LB Removal Time: 1002  -LB    Retired Wound - Properties Group Placement Date: 08/24/22  -MV Placement Time: 0914  -MV Present on Original Admission: N  -MV Side: Left  -MV Orientation: lower  -MV Location: chest  -MV Primary Wound Type: Incision  -MV Removal Date: 12/28/23  -LB Removal Time: 1002  -LB    Retired Wound - Properties Group Date first assessed: 08/24/22  -MV Time first assessed: 0914  -MV Present on Original Admission: N  -MV Side: Left  -MV Location: chest  -MV Primary Wound Type: Incision  -MV Resolution Date: 12/28/23  -LB Resolution Time: 1002 -LB      Row Name 12/28/23 1311          Plan of Care Review    Plan of Care Reviewed With patient  -KM     Outcome Evaluation Pt. evaluation completed during PT session. She was able to perform functional mobility skills independently. She ambulated prolonged distance w/ no AD. She had no complaints during PT session. Pt. does not require skilled PT services at this time.  -       Row Name 12/28/23 1311          Therapy Assessment/Plan (PT)    Functional Level at Time of Evaluation (PT) independent  -KM     Criteria for Skilled Interventions Met (PT) no;does not meet criteria for skilled intervention  -KM     Therapy Frequency (PT) evaluation only  -       Row Name 12/28/23 1311          Therapy Plan Review/Discharge Plan (PT)    Therapy Plan Review (PT) evaluation/treatment results reviewed;patient  -KM               User Key  (r) = Recorded By, (t) = Taken By, (c) = Cosigned By      Initials Name Provider Type    MV Bill Ha, RN Registered Nurse    Robin Gardner, PT Physical Therapist    Stephanie Ladd, RN Registered Nurse                    Physical Therapy Education       Title: PT OT SLP Therapies (Done)       Topic: Physical Therapy (Done)        Point: Mobility training (Done)       Learning Progress Summary             Patient Acceptance, E,TB, VU by  at 12/28/2023 1348                         Point: Home exercise program (Done)       Learning Progress Summary             Patient Acceptance, E,TB, VU by  at 12/28/2023 1348                         Point: Body mechanics (Done)       Learning Progress Summary             Patient Acceptance, E,TB, VU by  at 12/28/2023 1348                         Point: Precautions (Done)       Learning Progress Summary             Patient Acceptance, E,TB, VU by  at 12/28/2023 1348                                         User Key       Initials Effective Dates Name Provider Type Discipline     05/24/22 -  Robin Mascorro, PT Physical Therapist PT                  PT Recommendation and Plan  Anticipated Discharge Disposition (PT): home, home with assist  Therapy Frequency (PT): evaluation only  Plan of Care Reviewed With: patient  Outcome Evaluation: Pt. evaluation completed during PT session. She was able to perform functional mobility skills independently. She ambulated prolonged distance w/ no AD. She had no complaints during PT session. Pt. does not require skilled PT services at this time.       Time Calculation:    PT Charges       Row Name 12/28/23 1311             Time Calculation    PT Received On 12/28/23  -                User Key  (r) = Recorded By, (t) = Taken By, (c) = Cosigned By      Initials Name Provider Type    Robin Gardner, PT Physical Therapist                  Therapy Charges for Today       Code Description Service Date Service Provider Modifiers Qty    [id number] HC PT EVAL LOW COMPLEXITY 4 12/28/2023 Robin Mascorro, PT GP 1            PT G-Codes  AM-PAC 6 Clicks Score (PT): 24    Robin Mascorro PT  12/28/2023

## 2023-12-28 NOTE — CONSULTS
[facility]  CONSULT  NOTE      Patient information:  Date of Admit: 12/27/2023  Date of Consult: 12/28/23  Hospitalist/Referring MD:Rosalina Maynard DO;   PCP: Miri Brennan DO  MRN:  [id number]  Visit Number:  [id number]    LOS: 0  CODE STATUS:  Code Status and Medical Interventions:   Ordered at: 12/28/23 0038     Code Status (Patient has no pulse and is not breathing):    CPR (Attempt to Resuscitate)     Medical Interventions (Patient has pulse or is breathing):    Full Support       PROBLEM LIST: Principal Problem:    Hypokalemia      Inpatient Cardiology Consult  Consult performed by: Kat Pablo APRN  Consult ordered by: Rosalina Maynard DO        08:59 EST  12/28/2023    General Cardiology Consulting Physician: Dr. Ana Maria MD    Assessment    Runs of V. tach and frequent PVCs  Possible new onset heart failure with mild proBNP elevation with pulmonary vascular congestion on CXR possibly tachyarrhythmia induced.   History of SSS s/p pacemaker in situ 08/24/2022  History of atrial fibrillation status post previous cardioversion  Hypertension  Hypokalemia  Borderline hypomagnesia        Recommendations   Pacemaker interrogation.  D/C Flecainide.  Start metoprolol succinate XL 12.5 mg p.o. daily   Continue with Xarelto, lisinopril and HCTZ  Try to maintain potassium 4.0-4.5 and magnesium of 2.0-2.2, supplement per hospital Cardiology protocol as needed.       Reason for Cardiology consultation: Runs of V tach and frequent PVCs s/p PM with history of A fib.     Subjective Data   ADMISSION INFORMATION:  Chief Complaint   Patient presents with    Atrial Fibrillation    Fatigue     History of Present Illness    Radha Negrete is a 78 y.o. female with a past medical history significant for SSS status post [employer] pacemaker implantation 08/24/2022 per Dr. Howell, atrial fibrillation, hypertension, and arthritis.    Patient presented to  [facility]) emergency room (ER) on 12/27/2023 with complaints of fatigue.  Patient denied fever, chills, nausea, vomiting, chest pain/pressure, palpitations,, shortness of breath, dysuria, or diarrhea.  She denied any recent medication changes or known hypotension at home.  Patient is status post right knee replacement on or about 11/3/2023 and patient reports she has progressed well.  She does report intermittent cramping in the right calf and intermittent swelling in both ankles and was toed by the orthopedic surgeon that the swelling could continue 1-2 more months.  Patient is currently on Xarelto for stroke prevention in the setting of A-fib and has served as DVT prophylaxis in the postoperative setting.    Cardiology has been consulted for further evaluation and management runs of V tach and frequent PVCs s/p PM with history of A fib.     Primary Cardiologist has been Dr. Howell and she was last seen in house at [facility] ([facility]) on 08/24/2022 when PM was implanted.     Patient is in room 302 A when she was seen and examined.  Telemetry reveals paced rhythm 80s.  Please see telemetry strips attached below.  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  AM EKG reveals a QTc of 467 ms and a QRS duration of 136 ms.  HS troponin initially was 28 with a downward trend to 23.  proBNP 2918.0.  Sodium 141, potassium 3.0, chloride 101, CO2 30.0, BUN 19, and a creatinine of 0.86 which is improved from admission at 1.04.  Potassium was supplemented per patient's MAR.  Magnesium level is 2.2 which is 1.8 on admission after supplementing.  Phosphorus level is 2.3.  Hemoglobin is 10.6 and platelet count is 278.  Patient is lying in bed resting quietly.  No acute distress noted at this time.  Patient currently denies any chest pain, shortness of breath, or palpitations.  Complete echo has been ordered with results pending.      Known medications given enroute vis EMS and in  the ER:         Cardiac risk factors:hypertension      Last Echo:  None found in patient's chart.      Last Stress: None found in patient's chart.      Last Cath:  None found in patient's chart.    Pacemaker implantation information:  08/24/2022                        Past Medical History:   Diagnosis Date    Arthritis     Hypertension     PONV (postoperative nausea and vomiting)      Past Surgical History:   Procedure Laterality Date    BLADDER SURGERY      CARDIAC ELECTROPHYSIOLOGY PROCEDURE N/A 8/24/2022    Procedure: Pacemaker DC new 16009;  Surgeon: Kevin Howell MD;  Location: Replaced by [facility] CATH INVASIVE LOCATION;  Service: Cardiology;  Laterality: N/A;    CARDIAC SURGERY      CARDIOVERSION      6/17/21,9/3/21,2/22/22    CHOLECYSTECTOMY      COLONOSCOPY  01/2016    normal    HYSTERECTOMY      PACEMAKER IMPLANTATION      08/24/2022 PER DR. HOWELL     Family History   Problem Relation Age of Onset    Colon polyps Brother     Colon cancer Neg Hx      Social History     Tobacco Use    Smoking status: Never    Smokeless tobacco: Never   Vaping Use    Vaping Use: Never used   Substance Use Topics    Alcohol use: Never    Drug use: Never     ALLERGIES: Ceftriaxone and Latex    Medications listed below are reported home medications pulling from within the system:  Medications Prior to Admission   Medication Sig Dispense Refill Last Dose    Diclofenac Sodium (VOLTAREN) 1 % gel gel Apply 4 g topically to the appropriate area as directed 2 (Two) Times a Day.   12/27/2023    flecainide (TAMBOCOR) 100 MG tablet Take 1 tablet by mouth 2 (Two) Times a Day.   12/27/2023    lisinopril-hydrochlorothiazide (PRINZIDE,ZESTORETIC) 10-12.5 MG per tablet Take 1 tablet by mouth 2 (Two) Times a Day.   12/27/2023    Multiple Vitamins-Minerals (Ocuvite Eye Health Formula) capsule Take 1 capsule by mouth Daily.   12/27/2023    rivaroxaban (XARELTO) 15 MG tablet Take 1 tablet by mouth Daily.   12/27/2023    ursodiol (ACTIGALL) 300 MG  capsule Take 3 capsules by mouth Every Morning.   12/27/2023    ursodiol (ACTIGALL) 300 MG capsule Take 2 capsules by mouth Every Evening.   12/27/2023       Review of Systems   Constitutional:  Positive for appetite change and fatigue. Negative for activity change and diaphoresis.   HENT:  Negative for facial swelling and trouble swallowing.    Eyes:  Negative for visual disturbance.   Respiratory:  Negative for shortness of breath.    Cardiovascular:  Positive for leg swelling. Negative for chest pain and palpitations.   Gastrointestinal:  Negative for blood in stool, nausea and vomiting.   Endocrine: Negative.    Genitourinary:  Negative for hematuria.   Musculoskeletal:  Negative for gait problem.   Skin:  Negative for color change.   Neurological:  Negative for dizziness, syncope, speech difficulty, weakness, light-headedness and headaches.   Psychiatric/Behavioral:  Negative for agitation and behavioral problems.        Objective Data   Vital Signs  Temp:  [97.9 °F (36.6 °C)-98.2 °F (36.8 °C)] 98.2 °F (36.8 °C)  Heart Rate:  [79-95] 91  Resp:  [19-21] 19  BP: (114-154)/(56-80) 133/68  Device (Oxygen Therapy): room air  Vital Signs (last 72 hrs)         12/25 0700  12/26 0659 12/26 0700  12/27 0659 12/27 0700  12/28 0659 12/28 0700  12/28 0859   Most Recent      Temp (°F)     97.9 -  98.2       98.2 (36.8) 12/28 0645    Heart Rate     79 -  95       91 12/28 0645    Resp     19 -  21       19 12/28 0645    BP     114/69 -  154/66       133/68 12/28 0645    SpO2 (%)     94 -  99       96 12/28 0645          BMI:   Body mass index is 33.9 kg/m².  WEIGHT:  Wt Readings from Last 3 Encounters:   12/28/23 99.7 kg (219 lb 11.2 oz)   08/24/22 99.7 kg (219 lb 14.4 oz)   02/22/22 102 kg (224 lb 12.8 oz)     DIET:  Diet: Cardiac Diets; Healthy Heart (2-3 Na+); Texture: Regular Texture (IDDSI 7); Fluid Consistency: Thin (IDDSI 0)  I&O:  Intake & Output (last 3 days)         12/25 0701 12/26 0700 12/26 0701 12/27 0700  12/27 0701  12/28 0700 12/28 0701  12/29 0700    P.O.   120 360    I.V. (mL/kg)    50 (0.5)    Total Intake(mL/kg)   120 (1.2) 410 (4.1)    Urine (mL/kg/hr)    450 (2.3)    Total Output    450    Net   +120 -40            Urine Unmeasured Occurrence   3 x             Physical Exam  Constitutional:       Appearance: Normal appearance.   HENT:      Head: Normocephalic and atraumatic.      Nose: Nose normal.      Mouth/Throat:      Mouth: Mucous membranes are moist.      Pharynx: Oropharynx is clear.   Eyes:      Conjunctiva/sclera: Conjunctivae normal.   Cardiovascular:      Rate and Rhythm: Normal rate and regular rhythm.      Pulses: Normal pulses.      Heart sounds: Normal heart sounds.   Pulmonary:      Effort: Pulmonary effort is normal.      Breath sounds: Normal breath sounds.   Abdominal:      General: Bowel sounds are normal.      Palpations: Abdomen is soft.   Musculoskeletal:         General: Normal range of motion.      Cervical back: Normal range of motion.   Skin:     General: Skin is warm and dry.   Neurological:      General: No focal deficit present.      Mental Status: She is alert and oriented to person, place, and time.   Psychiatric:         Mood and Affect: Mood normal.         Behavior: Behavior normal.         Results review   Results Review:    I have reviewed the patient's new clinical results. 12/28/23 08:59 EST    Results from last 7 days   Lab Units 12/27/23 2234 12/27/23 2036   HSTROP T ng/L 23* 28*     Lab Results   Component Value Date    PROBNP 2,918.0 (H) 12/27/2023     Results from last 7 days   Lab Units 12/28/23  0155 12/27/23 2036   WBC 10*3/mm3 7.01 8.57   HEMOGLOBIN g/dL 10.6* 11.4*   PLATELETS 10*3/mm3 278 295     Results from last 7 days   Lab Units 12/28/23 0156 12/27/23 2036   SODIUM mmol/L 141 138   POTASSIUM mmol/L 3.0* 2.8*   CHLORIDE mmol/L 101 99   CO2 mmol/L 30.0* 31.0*   BUN mg/dL 19 21   CREATININE mg/dL 0.86 1.04*   CALCIUM mg/dL 8.8 9.0   GLUCOSE mg/dL 111*  "110*   ALT (SGPT) U/L  --  10   AST (SGOT) U/L  --  23     Lab Results   Component Value Date    MG 2.2 2023    MG 1.8 2023     No results found for: \"CHOL\", \"TRIG\", \"HDL\", \"LDL\"  Estimated Creatinine Clearance: 67.1 mL/min (by C-G formula based on SCr of 0.86 mg/dL).  No results found for: \"HGBA1C\"  Lab Results   Component Value Date    INR 2.09 (H) 10/19/2021     No results found for: \"LABHEPA\"  No components found for: \"DIG\"  Lab Results   Component Value Date    TSH 1.630 2023      No results found for: \"URICACID\"  Pain Management Panel           No data to display              Microbiology Results (last 10 days)       Procedure Component Value - Date/Time    COVID-19 and FLU A/B PCR, 1 HR TAT - Swab, Nasopharynx [[id number]]  (Normal) Collected: 23    Lab Status: Final result Specimen: Swab from Nasopharynx Updated: 23     COVID19 Not Detected     Influenza A PCR Not Detected     Influenza B PCR Not Detected    Narrative:      Fact sheet for providers: https://www.fda.gov/media/746203/download    Fact sheet for patients: https://www.fda.gov/media/967795/download    Test performed by PCR.           No results found for: \"BLOODCX\"      EC2023      ECG/EMG Results (last 24 hours)       Procedure Component Value Units Date/Time    ECG 12 Lead Rhythm Change [[id number]] Collected: 23     Updated: 23     QT Interval 388 ms      QTC Interval 469 ms     Narrative:      Test Reason : Rhythm Change  Blood Pressure :   */*   mmHG  Vent. Rate :  88 BPM     Atrial Rate :  81 BPM     P-R Int :   * ms          QRS Dur : 148 ms      QT Int : 388 ms       P-R-T Axes :   * 141 -48 degrees     QTc Int : 469 ms    ** Suspect unspecified pacemaker failure  Atrial fibrillation with frequent ventricular-paced complexes  Nonspecific intraventricular block  Abnormal ECG  When compared with ECG of 2022 13:21,  Electronic ventricular " pacemaker has replaced Sinus rhythm    Referred By: FORD           Confirmed By:     ECG 12 Lead QT Measurement [[id number]] Collected: 12/27/23 2212     Updated: 12/27/23 2213     QT Interval 458 ms      QTC Interval 551 ms     Narrative:      Test Reason : QT Measurement  Blood Pressure :   */*   mmHG  Vent. Rate :  87 BPM     Atrial Rate : 468 BPM     P-R Int :   * ms          QRS Dur : 176 ms      QT Int : 458 ms       P-R-T Axes :   *  -6 107 degrees     QTc Int : 551 ms    Undetermined rhythm  Nonspecific intraventricular block  Abnormal ECG  When compared with ECG of 27-DEC-2023 20:09, (Unconfirmed)  Current undetermined rhythm precludes rhythm comparison, needs review    Referred By: FORBES           Confirmed By:     ECG 12 Lead Rhythm Change [[id number]] Collected: 12/28/23 0137     Updated: 12/28/23 0138     QT Interval 386 ms      QTC Interval 467 ms     Narrative:      Test Reason : Rhythm Change  Blood Pressure :   */*   mmHG  Vent. Rate :  88 BPM     Atrial Rate :  41 BPM     P-R Int :   * ms          QRS Dur : 136 ms      QT Int : 386 ms       P-R-T Axes :   *  -5 216 degrees     QTc Int : 467 ms    Ventricular-paced rhythm with occasional AV dual-paced complexes  Abnormal ECG  When compared with ECG of 27-DEC-2023 22:12, (Unconfirmed)  Previous ECG has undetermined rhythm, needs review    Referred By:            Confirmed By:           TELEMETRY:   Paced 90s                       RADIOLOGY STUDIES:  Imaging Results (Last 72 Hours)       Procedure Component Value Units Date/Time    XR Chest 1 View [[id number]] Collected: 12/27/23 2321     Updated: 12/27/23 2324    Narrative:      INDICATION: Difficulty breathing.     TECHNIQUE: Frontal radiograph of the chest.     COMPARISON: 8/24/2022     FINDINGS:   Elevation of the right hemidiaphragm. Cardiomegaly. Mild pulmonary  vascular congestion. No infiltrate, pleural effusion or pneumothorax.  Left-sided pacemaker again noted. No acute fracture.        Impression:      Mild pulmonary vascular congestion.        This report was finalized on 12/27/2023 11:21 PM by Alex Pallas, DO.               ALLERGIES: Ceftriaxone and Latex    CURRENT MEDICATIONS:  Current list of medications may not reflect those currently placed in orders that are not signed or are being held.     potassium chloride, 40 mEq, Oral, Q4H  rivaroxaban, 15 mg, Oral, Daily With Dinner  senna-docusate sodium, 2 tablet, Oral, BID  sodium chloride, 10 mL, Intravenous, Q12H           senna-docusate sodium **AND** polyethylene glycol **AND** bisacodyl **AND** bisacodyl    Calcium Replacement - Follow Nurse / BPA Driven Protocol    Magnesium Cardiology Dose Replacement - Follow Nurse / BPA Driven Protocol    nitroglycerin    Phosphorus Replacement - Follow Nurse / BPA Driven Protocol    Potassium Replacement - Follow Nurse / BPA Driven Protocol    sodium chloride    sodium chloride    sodium chloride        Thank you very much for asking us to be involved in this patient's care.  We will follow along with you. Please do not hesitate to call for any questions or concerns.     I have discussed the patients findings and recommendations with patient.      Electronically signed by KARO Rodriguez, 12/28/23, 4:00 PM EST.                   Please note that portions of this note were copied and has been reviewed and is accurate as of 12/28/2023 .      Please note that portions of this note were completed with a voice recognition program.

## 2023-12-28 NOTE — PROGRESS NOTES
[facility] HOSPITALIST PROGRESS NOTE     Patient Identification:  Name:  Radha Negrete  Age:  78 y.o.  Sex:  female  :  1945  MRN:  [id number]  Visit Number:  [id number]  ROOM: [address] [place] [address]     Primary Care Provider:  Miri Brennan DO    Length of stay in inpatient status:  0    Subjective     Chief Compliant:    Chief Complaint   Patient presents with    Atrial Fibrillation    Fatigue       History of Presenting Illness:  Patient seen and evaluated in follow up for atrial fibrillation with malfunctioning pacemaker with significant acute hypokalemia and borderline hypomagnesemia with fatigue likely secondary to electrolyte issues.  Patient today without acute complaint however did have episode this afternoon after reprogramming of pacemaker with oversensing and inappropriate increased pacing.    Objective     Current Hospital Meds:  Diclofenac Sodium, 4 g, Topical, BID  lisinopril, 10 mg, Oral, Q24H   And  hydroCHLOROthiazide, 12.5 mg, Oral, Q24H  metoprolol succinate XL, 12.5 mg, Oral, Q24H  rivaroxaban, 15 mg, Oral, Daily With Dinner  senna-docusate sodium, 2 tablet, Oral, BID  sodium chloride, 10 mL, Intravenous, Q12H  ursodiol, 600 mg, Oral, Q PM  ursodiol, 900 mg, Oral, Daily         ----------------------------------------------------------------------------------------------------------------------  Vital Signs:  Temp:  [97.9 °F (36.6 °C)-98.2 °F (36.8 °C)] 98 °F (36.7 °C)  Heart Rate:  [74-95] 74  Resp:  [18-21] 18  BP: (114-154)/(56-84) 120/64  SpO2:  [94 %-99 %] 95 %  on   ;   Device (Oxygen Therapy): room air  Body mass index is 33.9 kg/m².      Intake/Output Summary (Last 24 hours) at 2023 6582  Last data filed at 2023 2180  Gross per 24 hour   Intake 1250 ml   Output 700 ml   Net 550 ml      ----------------------------------------------------------------------------------------------------------------------  Physical exam:  Constitutional:  Well-developed and  "well-nourished.  No acute distress.      HENT:  Head:  Normocephalic and atraumatic.  Mouth:  Moist mucous membranes.    Eyes:  Conjunctivae and EOM are normal. No scleral icterus.    Neck:  Neck supple.  No JVD present.    Cardiovascular: Irregular rate and rhythm with no murmur, rub, or gallop.  Pulmonary/Chest:  No respiratory distress, no wheezes, no crackles, with normal breath sounds and good air movement.  Abdominal:  Soft.  Bowel sounds are normal.  No distension and no tenderness.   Musculoskeletal:  No tenderness or deformity.  No red or swollen joints anywhere.  Functional ROM intact.   Neurological:  Alert and oriented to person, place, and time.  No cranial nerve deficit.  No tongue deviation.  No facial droop.  No slurred speech. Intact Sensation throughout  Skin:  Skin is warm and dry. No rash or lesion noted. No pallor.   Peripheral vascular:  Pulses in all 4 extremities with no clubbing, no cyanosis, no edema.  Psychiatric: Appropriate mood and affect, pleasant.   ----------------------------------------------------------------------------------------------------------------------  WBC/HGB/HCT/PLT   7.01/10.6/32.3/278 (12/28 0155)  BUN/CREAT/GLUC/ALT/AST/JAMIE/LIP    17/0.98/132/10/23/--/-- (12/27 2036-12/28 1442)  LYTES - Na/K/Cl/CO2: 136/3.9/102/29.0 (12/28 1442)        No results found for: \"URINECX\"  No results found for: \"BLOODCX\"    I have personally looked at the labs and they are summarized above.  ----------------------------------------------------------------------------------------------------------------------  Detailed radiology reports for the last 24 hours:  XR Chest 1 View    Result Date: 12/27/2023  Mild pulmonary vascular congestion.   This report was finalized on 12/27/2023 11:21 PM by Alex Pallas, DO.     Assessment & Plan      Atrial fibrillation  Pacemaker malfunction,? Oversensing?  History of sick sinus syndrome s/p PPM  History of prior cardioversion for A-fib    -Patient " presenting with generalized complaints of fatigue with what originally appeared to be runs of ventricular tachycardia and noted to also have a magnesium of 1.8 and hypokalemic with potassium 2.8.    -Cardiology consulted given intermittent rhythm issues and patient complaining of fatigue concerning for symptomatology related to this as well as some concern for new onset heart failure given lower extremity edema    -Pacemaker interrogation ordered and showed atrial fibrillation but no ventricular tachycardia and due to over poorly active pacing of the pacemaker and reprogram.  However later in the day patient with again runs of what appeared to be V. tach but were in fact oversensing of pacemaker with an appropriate pacing signal since being sent however patient remained hemodynamically stable and asymptomatic.  Pacemaker company contacted again for return to reprogram and readjust pacemaker.    -TTE obtained which shows EF of 61 to 65% with indeterminate diastolic function with left atrial volume severely increased and mild calcification of the aortic valve with moderate tricuspid valve regurg and estimated RVSP moderately elevated at 45 to 55 mmHg.  There is a small pericardial effusion without evidence of tamponade physiology.    -Patient's peripheral edema likely related to discoordination of pacing from pacemaker malfunction rather than heart failure.    -Given review of pacemaker monitoring and patient having persistent recurrent atrial fibrillation despite flecainide therapy cardiology discontinued flecainide and instead pursuing rate control with metoprolol.    -Continue Xarelto for stroke prophylaxis    Hypokalemia  Borderline hypomagnesemia    - supplement as necessary with target potassium of 4.0 and target magnesium of 2.0    Hypertension    -Continue home antihypertensives    Osteoarthritis    -supportive care    S/p recent right TKA         Copied text in portions of the note has been reviewed and is  accurate as of 12/28/23    VTE Prophylaxis:   Mechanical Order History:       None          Pharmalogical Order History:        Ordered     Dose Route Frequency Stop    12/28/23 0119  rivaroxaban (XARELTO) tablet 15 mg        Question:  Are you ordering rivaroxaban 10 mg for the prevention of blood clots in an acutely ill medical patient?  Answer:  No    15 mg PO Daily With Dinner --    12/28/23 0116  heparin (porcine) 5000 UNIT/ML injection 5,000 Units  Status:  Discontinued         5,000 Units SC Every 12 Hours Scheduled 12/28/23 0117                    Disposition pending clinical course and correction of pacemaker programming and stable without issues for at least 24 hours    Codey Garza DO  [facility] Hospitalist  12/28/23  18:42 EST

## 2023-12-28 NOTE — ED PROVIDER NOTES
Subjective   History of Present Illness  78-year-old female with a past medical history of hypertension and sick sinus syndrome with current pacemaker placed presents to the ER with primary complaint of increasing fatigue.  Patient noted generalized weakness without signs of chest pain or shortness of breath.  Patient noted she has a history of atrial fibrillation as well.  No concerns for fever or chills.  No changes in bowel or urinary habits.  No headache or vision changes.  Vitals stable.  Afebrile      Review of Systems   Constitutional:  Positive for fatigue.   Musculoskeletal:  Positive for myalgias.   All other systems reviewed and are negative.      Past Medical History:   Diagnosis Date    Arthritis     Hypertension     PONV (postoperative nausea and vomiting)        Allergies   Allergen Reactions    Ceftriaxone Itching    Latex Itching       Past Surgical History:   Procedure Laterality Date    BLADDER SURGERY      CARDIAC ELECTROPHYSIOLOGY PROCEDURE N/A 8/24/2022    Procedure: Pacemaker DC new 55704;  Surgeon: Kevin Howell MD;  Location: [facility] INVASIVE LOCATION;  Service: Cardiology;  Laterality: N/A;    CARDIAC SURGERY      CARDIOVERSION      6/17/21,9/3/21,2/22/22    CHOLECYSTECTOMY      COLONOSCOPY  01/2016    normal    HYSTERECTOMY      PACEMAKER IMPLANTATION      08/24/2022 PER DR. HOWELL       Family History   Problem Relation Age of Onset    Colon polyps Brother     Colon cancer Neg Hx        Social History     Socioeconomic History    Marital status:    Tobacco Use    Smoking status: Never    Smokeless tobacco: Never   Vaping Use    Vaping Use: Never used   Substance and Sexual Activity    Alcohol use: Never    Drug use: Never    Sexual activity: Defer           Objective   Physical Exam  Constitutional:       General: She is not in acute distress.     Appearance: Normal appearance. She is not ill-appearing.   HENT:      Head: Normocephalic and atraumatic.      Right Ear:  External ear normal.      Left Ear: External ear normal.      Nose: Nose normal.      Mouth/Throat:      Mouth: Mucous membranes are moist.   Eyes:      Extraocular Movements: Extraocular movements intact.      Pupils: Pupils are equal, round, and reactive to light.   Cardiovascular:      Rate and Rhythm: Normal rate. Rhythm irregularly irregular.      Heart sounds: No murmur heard.  Pulmonary:      Effort: Pulmonary effort is normal. No respiratory distress.      Breath sounds: Normal breath sounds. No wheezing.   Abdominal:      General: Bowel sounds are normal.      Palpations: Abdomen is soft.      Tenderness: There is no abdominal tenderness.   Musculoskeletal:         General: No deformity or signs of injury. Normal range of motion.      Cervical back: Normal range of motion and neck supple.   Skin:     General: Skin is warm and dry.      Findings: No erythema.   Neurological:      General: No focal deficit present.      Mental Status: She is alert and oriented to person, place, and time. Mental status is at baseline.      Cranial Nerves: No cranial nerve deficit.   Psychiatric:         Mood and Affect: Mood normal.         Behavior: Behavior normal.         Thought Content: Thought content normal.         Procedures           ED Course  ED Course as of 12/28/23 0027   Wed Dec 27, 2023   2010 EKG notes atrial fibrillation.  88 bpm.  Nonspecific intraventricular block.  No signs of acute ischemia.  Electronically signed by Puneet Stewart DO, 12/27/23, 8:11 PM EST.    [SF]   2246 Repeat EKG has sinus rhythm with multiple PVCs.  Couplets and triplets noted.  No acute ST elevation.  87 bpm.  QTc 551.  Electronically signed by Puneet Stewart DO, 12/27/23, 10:47 PM EST.   [SF]      ED Course User Index  [SF] Puneet Stewart DO      XR Chest 1 View    Result Date: 12/27/2023  Mild pulmonary vascular congestion.   This report was finalized on 12/27/2023 11:21 PM by Alex Pallas, DO.         Results for orders placed  or performed during the hospital encounter of 12/27/23   COVID-19 and FLU A/B PCR, 1 HR TAT - Swab, Nasopharynx    Specimen: Nasopharynx; Swab   Result Value Ref Range    COVID19 Not Detected Not Detected - Ref. Range    Influenza A PCR Not Detected Not Detected    Influenza B PCR Not Detected Not Detected   Comprehensive Metabolic Panel    Specimen: Arm, Left; Blood   Result Value Ref Range    Glucose 110 (H) 65 - 99 mg/dL    BUN 21 8 - 23 mg/dL    Creatinine 1.04 (H) 0.57 - 1.00 mg/dL    Sodium 138 136 - 145 mmol/L    Potassium 2.8 (L) 3.5 - 5.2 mmol/L    Chloride 99 98 - 107 mmol/L    CO2 31.0 (H) 22.0 - 29.0 mmol/L    Calcium 9.0 8.6 - 10.5 mg/dL    Total Protein 8.1 6.0 - 8.5 g/dL    Albumin 3.9 3.5 - 5.2 g/dL    ALT (SGPT) 10 1 - 33 U/L    AST (SGOT) 23 1 - 32 U/L    Alkaline Phosphatase 192 (H) 39 - 117 U/L    Total Bilirubin 0.7 0.0 - 1.2 mg/dL    Globulin 4.2 gm/dL    A/G Ratio 0.9 g/dL    BUN/Creatinine Ratio 20.2 7.0 - 25.0    Anion Gap 8.0 5.0 - 15.0 mmol/L    eGFR 55.5 (L) >60.0 mL/min/1.73   Lactic Acid, Plasma    Specimen: Arm, Left; Blood   Result Value Ref Range    Lactate 1.1 0.5 - 2.0 mmol/L   CBC Auto Differential    Specimen: Arm, Left; Blood   Result Value Ref Range    WBC 8.57 3.40 - 10.80 10*3/mm3    RBC 3.79 3.77 - 5.28 10*6/mm3    Hemoglobin 11.4 (L) 12.0 - 15.9 g/dL    Hematocrit 35.1 34.0 - 46.6 %    MCV 92.6 79.0 - 97.0 fL    MCH 30.1 26.6 - 33.0 pg    MCHC 32.5 31.5 - 35.7 g/dL    RDW 14.4 12.3 - 15.4 %    RDW-SD 49.1 37.0 - 54.0 fl    MPV 9.4 6.0 - 12.0 fL    Platelets 295 140 - 450 10*3/mm3    Neutrophil % 62.1 42.7 - 76.0 %    Lymphocyte % 26.8 19.6 - 45.3 %    Monocyte % 7.8 5.0 - 12.0 %    Eosinophil % 2.6 0.3 - 6.2 %    Basophil % 0.5 0.0 - 1.5 %    Immature Grans % 0.2 0.0 - 0.5 %    Neutrophils, Absolute 5.32 1.70 - 7.00 10*3/mm3    Lymphocytes, Absolute 2.30 0.70 - 3.10 10*3/mm3    Monocytes, Absolute 0.67 0.10 - 0.90 10*3/mm3    Eosinophils, Absolute 0.22 0.00 - 0.40 10*3/mm3     Basophils, Absolute 0.04 0.00 - 0.20 10*3/mm3    Immature Grans, Absolute 0.02 0.00 - 0.05 10*3/mm3    nRBC 0.0 0.0 - 0.2 /100 WBC   Urinalysis With Culture If Indicated - Urine, Clean Catch    Specimen: Urine, Clean Catch   Result Value Ref Range    Color, UA Yellow Yellow, Straw    Appearance, UA Cloudy (A) Clear    pH, UA 6.5 5.0 - 8.0    Specific Gravity, UA 1.013 1.005 - 1.030    Glucose, UA Negative Negative    Ketones, UA Negative Negative    Bilirubin, UA Negative Negative    Blood, UA Negative Negative    Protein, UA Negative Negative    Leuk Esterase, UA Negative Negative    Nitrite, UA Negative Negative    Urobilinogen, UA 0.2 E.U./dL 0.2 - 1.0 E.U./dL   Magnesium    Specimen: Arm, Left; Blood   Result Value Ref Range    Magnesium 1.8 1.6 - 2.4 mg/dL   High Sensitivity Troponin T    Specimen: Arm, Left; Blood   Result Value Ref Range    HS Troponin T 28 (H) <14 ng/L   High Sensitivity Troponin T 2Hr    Specimen: Arm, Left; Blood   Result Value Ref Range    HS Troponin T 23 (H) <14 ng/L    Troponin T Delta -5 (L) >=-4 - <+4 ng/L   ECG 12 Lead Rhythm Change   Result Value Ref Range    QT Interval 388 ms    QTC Interval 469 ms   ECG 12 Lead QT Measurement   Result Value Ref Range    QT Interval 458 ms    QTC Interval 551 ms   Green Top (Gel)   Result Value Ref Range    Extra Tube Hold for add-ons.    Lavender Top   Result Value Ref Range    Extra Tube hold for add-on    Gold Top - SST   Result Value Ref Range    Extra Tube Hold for add-ons.    Light Blue Top   Result Value Ref Range    Extra Tube Hold for add-ons.                                             Medical Decision Making  CBC and CMP are unremarkable.  Chest x-ray notes mild pulmonary congestion.  Initial EKG noted atrial fibrillation.  Nonspecific intraventricular block noted.  Pacemaker involvement as well.  During ER course, several runs of ventricular arrhythmias noted.  Patient remained asymptomatic during ER course.  Patient would have 2  and 3 beat runs of ventricular tachycardia.  Troponins noted non-ACS trend.  Concerns for observation admission status.  Recommend admission for further work up and treatment.  Hospitalist team consulted and made aware of the patient.  Consults and orders placed per hospitalist request.  Patient was agreeable to admission plan.  Vitals stable on admission.    Problems Addressed:  Ventricular arrhythmia: complicated acute illness or injury    Amount and/or Complexity of Data Reviewed  Labs: ordered. Decision-making details documented in ED Course.  Radiology: ordered. Decision-making details documented in ED Course.  ECG/medicine tests: ordered.    Risk  Prescription drug management.  Decision regarding hospitalization.        Final diagnoses:   Ventricular arrhythmia       ED Disposition  ED Disposition       ED Disposition   Decision to Admit    Condition   --    Comment   --               No follow-up provider specified.       Medication List      No changes were made to your prescriptions during this visit.            Puneet Stewart, DO  12/28/23 0027

## 2023-12-28 NOTE — CASE MANAGEMENT/SOCIAL WORK
Discharge Planning Assessment   [facility]     Patient Name: Radha Negrete  MRN: [id number]  Today's Date: 12/28/2023    Admit Date: 12/27/2023    Plan: SS [place] [facility] [place].  SS spoke with pt at bedside.  Pt resides at home with spouse, Adan, at [address] [facility] and plans to return home at discharge.  Pt currently does not utilize home health or DME.  Pt stated no POA or Living Will.  Pt's PCP is Miri Brennan.  Pt stated she drives self via private auto.  Pt's family will transport home via private auto.  SS will follow and assist with discharge needs.   Discharge Needs Assessment       Row Name 12/28/23 1423       Living Environment    People in Home spouse    Name(s) of People in Home Lives at home with spouse Adan    Current Living Arrangements home    Primary Care Provided by self    Provides Primary Care For no one    Family Caregiver if Needed child(ricci), adult;spouse    Quality of Family Relationships helpful;involved;supportive    Able to Return to Prior Arrangements yes       Resource/Environmental Concerns    Resource/Environmental Concerns none       Transition Planning    Patient/Family Anticipates Transition to home with family    Patient/Family Anticipated Services at Transition     Transportation Anticipated family or friend will provide       Discharge Needs Assessment    Equipment Currently Used at Home none                   Discharge Plan       Row Name 12/28/23 1426       Plan    Plan SS received consult [place] [facility] [place].  SS spoke with pt at bedside.  Pt resides at home with spouse, Adan, at [address] [facility] and plans to return home at discharge.  Pt currently does not utilize home health or DME.  Pt stated no POA or Living Will.  Pt's PCP is Miri Brennan.  Pt stated she drives self via private auto.  Pt's family will transport home via private auto.  SS will follow and assist with discharge needs.                   Continued Care and Services - Admitted Since 12/27/2023    Coordination has not been started for this encounter.       Expected Discharge Date and Time       Expected Discharge Date Expected Discharge Time    Dec 29, 2023            Demographic Summary       Row Name 12/28/23 0899       General Information    Admission Type observation    Arrived From home    Referral Source nursing    Reason for Consult discharge planning    Preferred Language English                  Faith Pruitt, BSW

## 2023-12-28 NOTE — PLAN OF CARE
Goal Outcome Evaluation:                   Pt admitted to 3S this shift. No s/s of acute distress noted at this time. Pt currently resting in bed. No complaints verbalized at this time. Plan of care ongoing.

## 2023-12-28 NOTE — H&P
[facility]   HISTORY AND PHYSICAL    Patient Name: Radha Negrete  : 1945  MRN: [id number]  Primary Care Physician:  Miri Brennan DO  Date of admission: 2023    Subjective   Subjective     Chief Complaint: Fatigue    History of Present Illness the patient is a 78-year-old female with past medical history significant for atrial fibrillation, hypertension and sick sinus syndrome status post permanent pacemaker placement who presents to the emergency department complaining of fatigue.    Patient seen and examined in .    Patient reports fatigue has been present for approximately 1 week.  She does not report any fever and/or chills.  No nausea or vomiting.  She denies any chest pains/pressure and/palpitations.  Furthermore, she denies any shortness of air and/or dyspnea on exertion.  No dysuria.  She denies any diarrhea.  She denies any new medications.  Patient denies any known hypotension at home.  Patient states that she had a right knee replacement on or about 11/3/2023 but states that she has progressed well with that.  Patient does report intermittent cramping in the right calf and intermittent swelling of both ankles but more so the right leg that she was told by orthopedic surgery could continue for 1 to 2 months further.  The patient is on Xarelto for stroke prevention in the setting of A-fib and this has also serve as DVT prophylaxis in the postoperative setting.    Review of Systems   Constitutional:  Positive for appetite change and fatigue. Negative for chills, diaphoresis and fever.   HENT:  Negative for hearing loss and trouble swallowing.    Eyes:  Negative for discharge and visual disturbance.   Respiratory:  Negative for cough, shortness of breath and wheezing.    Cardiovascular:  Positive for leg swelling. Negative for chest pain and palpitations.   Gastrointestinal:  Negative for abdominal pain, constipation, diarrhea, nausea and vomiting.   Endocrine: Negative for  polydipsia, polyphagia and polyuria.   Genitourinary:  Negative for dysuria, frequency and hematuria.   Musculoskeletal:  Negative for gait problem, myalgias and neck pain.   Skin:  Negative for rash.   Neurological:  Positive for weakness. Negative for dizziness, tremors, seizures, syncope and light-headedness.   Hematological:  Does not bruise/bleed easily.   Psychiatric/Behavioral:  Negative for confusion, hallucinations and suicidal ideas.       Personal History     Past Medical History:   Diagnosis Date    Arthritis     Hypertension     PONV (postoperative nausea and vomiting)        Past Surgical History:   Procedure Laterality Date    BLADDER SURGERY      CARDIAC ELECTROPHYSIOLOGY PROCEDURE N/A 8/24/2022    Procedure: Pacemaker DC new 84411;  Surgeon: Kevin Howell MD;  Location: [facility] INVASIVE LOCATION;  Service: Cardiology;  Laterality: N/A;    CARDIAC SURGERY      CARDIOVERSION      6/17/21,9/3/21,2/22/22    CHOLECYSTECTOMY      COLONOSCOPY  01/2016    normal    HYSTERECTOMY      PACEMAKER IMPLANTATION      08/24/2022 PER DR. HOWELL       Family History: family history includes Colon polyps in her brother.     Social History:  reports that she has never smoked. She has never used smokeless tobacco. She reports that she does not drink alcohol and does not use drugs.    Home Medications:  Diclofenac Sodium, flecainide, fluticasone, lisinopril-hydrochlorothiazide, multivitamin with minerals, rivaroxaban, and ursodiol    Allergies:  Allergies   Allergen Reactions    Ceftriaxone Itching    Latex Itching       Objective    Objective     Vitals:   Temp:  [98.1 °F (36.7 °C)] 98.1 °F (36.7 °C)  Heart Rate:  [79-95] 87  Resp:  [21] 21  BP: (114-154)/(56-80) 115/76    Physical Exam  Constitutional:       General: She is not in acute distress.     Appearance: She is well-developed.   HENT:      Head: Normocephalic and atraumatic.   Eyes:      Conjunctiva/sclera: Conjunctivae normal.   Neck:       Trachea: No tracheal deviation.   Cardiovascular:      Rate and Rhythm: Normal rate. Rhythm irregular.      Pulses:           Dorsalis pedis pulses are 2+ on the right side and 2+ on the left side.      Heart sounds: No murmur heard.     No friction rub. No gallop.      Comments: Mild edema B/L distal lower extremities. Bedside telemetry with SR, paced and PVCs  Pulmonary:      Effort: No respiratory distress.      Breath sounds: Normal breath sounds. No wheezing or rales.   Abdominal:      General: Bowel sounds are normal. There is no distension.      Palpations: Abdomen is soft.      Tenderness: There is no abdominal tenderness. There is no guarding.   Skin:     General: Skin is warm and dry.      Findings: No erythema or rash.   Neurological:      Mental Status: She is alert and oriented to person, place, and time.      Cranial Nerves: No cranial nerve deficit.         Result Review    Result Review:  I have personally reviewed the results from the time of this admission to 12/28/2023 01:08 EST and agree with these findings:  [x]  Laboratory list / accordion  []  Microbiology  [x]  Radiology  [x]  EKG/Telemetry   []  Cardiology/Vascular   []  Pathology  []  Old records  []  Other:  Most notable findings include:     Results from last 7 days   Lab Units 12/27/23 2036   WBC 10*3/mm3 8.57   HEMOGLOBIN g/dL 11.4*   HEMATOCRIT % 35.1   PLATELETS 10*3/mm3 295     Results from last 7 days   Lab Units 12/27/23 2036   SODIUM mmol/L 138   POTASSIUM mmol/L 2.8*   CHLORIDE mmol/L 99   CO2 mmol/L 31.0*   BUN mg/dL 21   CREATININE mg/dL 1.04*   CALCIUM mg/dL 9.0   BILIRUBIN mg/dL 0.7   ALK PHOS U/L 192*   ALT (SGPT) U/L 10   AST (SGOT) U/L 23   GLUCOSE mg/dL 110*     Mg 1.8        CXR (images reviewed):  FINDINGS:   Elevation of the right hemidiaphragm. Cardiomegaly. Mild pulmonary  vascular congestion. No infiltrate, pleural effusion or pneumothorax.  Left-sided pacemaker again noted. No acute fracture.      IMPRESSION:  Mild pulmonary vascular congestion.    Assessment / Plan     #(?)Symptomatic PVCs with intermittent runs of ventricular tachycardia  #Mild proBNP elevation with pulmonary vascular congestion on chest x-ray imaging, possible new onset acute heart failure; (?)tachyarrhythmia induced  #Acute hypokalemia, 2.8  #Borderline hypomagnesemia, 1.8  #Fatigue, likely multifactorial and due to above  #History of atrial fibrillation status post previous cardioversion  #History of sick sinus syndrome status post PPM  -Patient has been placed on the telemetry unit for further evaluation and management  -Patient has been started on the potassium and magnesium supplementation protocol  -Obtain BMP in a.m. and repeat her magnesium level in a.m.  -Serial EKGs  -Obtain an echocardiogram for further evaluation of EF and valvular function; no prior echocardiograms available for review  -Patient may benefit from beta blocker therapy given frequent PVCs/Runs of V-tach  -Plan to hold home HCTZ for now  -Consult Cardiology in a.m. for further recommendations; patient followed by Dr. Luther BHLex  -Check TSH    Further recommendations pending above mentioned results    Chronic medical conditions:  -Essential hypertension, currently controlled  -Osteoarthritis   -Status post recent R TKA    DVT prophylaxis:  Resume home xarelto    CODE STATUS:    Code Status (Patient has no pulse and is not breathing): CPR (Attempt to Resuscitate)  Medical Interventions (Patient has pulse or is breathing): Full Support    Admission Status:  I believe this patient meets Observation status.    Rosalina Maynard, DO

## 2023-12-29 VITALS
HEIGHT: 68 IN | SYSTOLIC BLOOD PRESSURE: 134 MMHG | RESPIRATION RATE: 15 BRPM | WEIGHT: 219.8 LBS | BODY MASS INDEX: 33.31 KG/M2 | HEART RATE: 70 BPM | TEMPERATURE: 98.1 F | OXYGEN SATURATION: 95 % | DIASTOLIC BLOOD PRESSURE: 72 MMHG

## 2023-12-29 LAB
ANION GAP SERPL CALCULATED.3IONS-SCNC: 8.8 MMOL/L (ref 5–15)
BUN SERPL-MCNC: 21 MG/DL (ref 8–23)
BUN/CREAT SERPL: 17.6 (ref 7–25)
CALCIUM SPEC-SCNC: 8.8 MG/DL (ref 8.6–10.5)
CHLORIDE SERPL-SCNC: 104 MMOL/L (ref 98–107)
CO2 SERPL-SCNC: 24.2 MMOL/L (ref 22–29)
CREAT SERPL-MCNC: 1.19 MG/DL (ref 0.57–1)
EGFRCR SERPLBLD CKD-EPI 2021: 47.2 ML/MIN/1.73
GLUCOSE SERPL-MCNC: 103 MG/DL (ref 65–99)
POTASSIUM SERPL-SCNC: 5.2 MMOL/L (ref 3.5–5.2)
QT INTERVAL: 350 MS
QT INTERVAL: 352 MS
QT INTERVAL: 524 MS
QTC INTERVAL: 456 MS
QTC INTERVAL: 477 MS
QTC INTERVAL: 752 MS
SODIUM SERPL-SCNC: 137 MMOL/L (ref 136–145)

## 2023-12-29 PROCEDURE — G0378 HOSPITAL OBSERVATION PER HR: HCPCS

## 2023-12-29 PROCEDURE — 80048 BASIC METABOLIC PNL TOTAL CA: CPT | Performed by: STUDENT IN AN ORGANIZED HEALTH CARE EDUCATION/TRAINING PROGRAM

## 2023-12-29 PROCEDURE — 99233 SBSQ HOSP IP/OBS HIGH 50: CPT | Performed by: NURSE PRACTITIONER

## 2023-12-29 PROCEDURE — 99222 1ST HOSP IP/OBS MODERATE 55: CPT | Performed by: NURSE PRACTITIONER

## 2023-12-29 RX ORDER — METOPROLOL SUCCINATE 25 MG/1
12.5 TABLET, EXTENDED RELEASE ORAL ONCE
Status: COMPLETED | OUTPATIENT
Start: 2023-12-29 | End: 2023-12-29

## 2023-12-29 RX ORDER — METOPROLOL SUCCINATE 50 MG/1
50 TABLET, EXTENDED RELEASE ORAL
Qty: 30 TABLET | Refills: 0 | Status: SHIPPED | OUTPATIENT
Start: 2023-12-30 | End: 2024-01-29

## 2023-12-29 RX ORDER — METOPROLOL SUCCINATE 25 MG/1
25 TABLET, EXTENDED RELEASE ORAL
Status: DISCONTINUED | OUTPATIENT
Start: 2023-12-30 | End: 2023-12-29 | Stop reason: HOSPADM

## 2023-12-29 RX ORDER — METOPROLOL SUCCINATE 50 MG/1
50 TABLET, EXTENDED RELEASE ORAL
Qty: 30 TABLET | Refills: 0 | Status: SHIPPED | OUTPATIENT
Start: 2023-12-30 | End: 2023-12-29 | Stop reason: SDUPTHER

## 2023-12-29 RX ADMIN — HYDROCHLOROTHIAZIDE 12.5 MG: 25 TABLET ORAL at 09:08

## 2023-12-29 RX ADMIN — URSODIOL 900 MG: 300 CAPSULE ORAL at 09:08

## 2023-12-29 RX ADMIN — METOPROLOL SUCCINATE 12.5 MG: 25 TABLET, EXTENDED RELEASE ORAL at 14:15

## 2023-12-29 RX ADMIN — DICLOFENAC SODIUM 4 G: 10 GEL TOPICAL at 09:08

## 2023-12-29 RX ADMIN — METOPROLOL SUCCINATE 12.5 MG: 25 TABLET, EXTENDED RELEASE ORAL at 09:08

## 2023-12-29 RX ADMIN — Medication 10 ML: at 09:08

## 2023-12-29 RX ADMIN — LISINOPRIL 10 MG: 10 TABLET ORAL at 09:07

## 2023-12-29 NOTE — PLAN OF CARE
Goal Outcome Evaluation:  Patient discharged home today. IV and telemetry D/C'd. Patient A&Ox4. Patient is currently on room air. Patient has tolerated all interventions. No complaints or concerns at this time. No signs of acute distress noted.

## 2023-12-29 NOTE — CASE MANAGEMENT/SOCIAL WORK
Discharge Planning Assessment  [facility]     Patient Name: Radha Negrete  MRN: [id number]  Today's Date: 12/29/2023    Admit Date: 12/27/2023    Plan: Pt resides at home with spouse, Adan, at [address] [facility] and plans to return home at discharge.  Pt currently does not utilize home health or DME.  Pt stated no POA or Living Will.  Pt's PCP is Miri Brennan.  Pt stated she drives self via private auto.  Pt's family will transport pt home via private auto.  Pt's family will transport home via private auto.  SS will follow and assist with discharge needs.       Discharge Plan       Row Name 12/29/23 1544       Plan    Plan Pt resides at home with spouse, Adan, at [address] [facility] and plans to return home at discharge.  Pt currently does not utilize home health or DME.  Pt stated no POA or Living Will.  Pt's PCP is Miri Brennan.  Pt stated she drives self via private auto.  Pt's family will transport pt home via private auto.  Pt's family will transport home via private auto.  SS will follow and assist with discharge needs.    Final Note 4                  Continued Care and Services - Admitted Since 12/27/2023    Coordination has not been started for this encounter.       Expected Discharge Date and Time       Expected Discharge Date Expected Discharge Time    Dec 29, 2023           CHARLOTTE Sin

## 2023-12-29 NOTE — DISCHARGE SUMMARY
[facility] HOSPITALISTS DISCHARGE SUMMARY    Patient Identification:  Name:  Radha Negrete  Age:  78 y.o.  Sex:  female  :  1945  MRN:  [id number]  Visit Number:  [id number]    Date of Admission: 2023  Date of Discharge:  2023     PCP: Miri Brennan, DO    DISCHARGE DIAGNOSIS  Atrial fibrillation  Pacemaker malfunction,? Oversensing?  History of sick sinus syndrome s/p PPM  History of prior cardioversion for A-fib  Hypokalemia  Borderline hypomagnesemia  Hypertension  Osteoarthritis  S/p recent right TKA    CONSULTS   Cardiology    PROCEDURES PERFORMED      HOSPITAL COURSE  Patient is a 78 y.o. female presented to [facility] complaining of fatigue.  Please see the admitting history and physical for further details.      After initial evaluation department patient mid to the hospitalist service for observation for concern for symptomatic PVCs with intermittent runs of ventricular tachycardia with concern for possible new onset acute heart failure versus tachyarrhythmia induced with acute hypokalemia at 2.8.  Patient admitted to the telemetry unit and electrolytes replaced with subsequent good improvement of potassium and magnesium to desired levels.  Cardiology was consulted as there was also concern for possible pacemaker malfunction given the runs of V. tach.  Pacemaker was interrogated the following day after admission which revealed not runs of V. tach but rather oversensing and attempted over pacing by her pacemaker that appeared like V. tach on rhythms.  I did show atrial fibrillation but no ventricular tachycardia.  Pacemaker was reprogrammed however later in the day the patient with again her runs of what appeared to be V. tach but were in fact over sensing the pacemaker with an inappropriate pacing signal being sent however patient remained hemodynamically stable and asymptomatic.  Pacemaker company was recontacted but unfortunately responded that  there could not be much else to do however did discuss with cardiology and solution was to increase beta-blockade slows to slow ventricular response.  Patient's beta-blocker therapy was initiated and increased while here in hospital as well as being discontinued off of previous flecainide as per review of pacemaker showed A-fib with flecainide obviously not working as intended and keeping patient in normal sinus rhythm.  Flecainide was discontinued at cardiology's recommendations.  An updated TTE was obtained which showed an EF of 61 to 65% with indeterminate diastolic function with left atrial volume severely increased mild calcification of the aortic valve with moderate tricuspid valve regurg and estimated RVSP moderately elevated to 45 to 55 mmHg.  There is also noted small pericardial effusion without evidence of tamponade physiology.  Patient's peripheral edema noted at admission did improve while in hospital and was felt to be likely due to atrial fibrillation and the malfunction of pacemaker.  By time of discharge patient was tolerating increased beta-blocker therapy without any symptomatology and pacing had improved.  Cardiology recommended the patient could be discharged home if heart rate remained controlled and patient asymptomatic when up and ambulating around which patient was able to do.  Thus the patient was felt to achieve max benefit of continued hospitalization and was discharged home in stable medical condition.  She will follow-up with her primary cardiologist and PCP postdischarge.    VITAL SIGNS:  Temp:  [97.9 °F (36.6 °C)-98.3 °F (36.8 °C)] 97.9 °F (36.6 °C)  Heart Rate:  [67-79] 70  Resp:  [18-20] 18  BP: (116-137)/(62-68) 123/68  SpO2:  [95 %-97 %] 97 %  on   ;   Device (Oxygen Therapy): room air    Body mass index is 33.92 kg/m².  Wt Readings from Last 3 Encounters:   12/29/23 99.7 kg (219 lb 12.8 oz)   08/24/22 99.7 kg (219 lb 14.4 oz)   02/22/22 102 kg (224 lb 12.8 oz)       PHYSICAL  EXAM:  Constitutional:  Well-developed and well-nourished.  No acute distress.      HENT:  Head:  Normocephalic and atraumatic.  Mouth:  Moist mucous membranes.    Eyes:  Conjunctivae and EOM are normal. No scleral icterus.    Neck:  Neck supple.  No JVD present.    Cardiovascular: Irregular rate and rhythm with no murmur, rub, or gallop.  Pulmonary/Chest:  No respiratory distress, no wheezes, no crackles, with normal breath sounds and good air movement.  Abdominal:  Soft.  Bowel sounds are normal.  No distension and no tenderness.   Musculoskeletal:  No tenderness or deformity.  No red or swollen joints anywhere.  Functional ROM intact.   Neurological:  Alert and oriented to person, place, and time.  No cranial nerve deficit.  No tongue deviation.  No facial droop.  No slurred speech. Intact Sensation throughout  Skin:  Skin is warm and dry. No rash or lesion noted. No pallor.   Peripheral vascular:  Pulses in all 4 extremities with no clubbing, no cyanosis, no edema.  Psychiatric: Appropriate mood and affect, pleasant.     DISCHARGE DISPOSITION   Stable    DISCHARGE MEDICATIONS:     Discharge Medications        New Medications        Instructions Start Date   metoprolol succinate XL 50 MG 24 hr tablet  Commonly known as: TOPROL-XL   50 mg, Oral, Every 24 Hours Scheduled   Start Date: December 30, 2023            Continue These Medications        Instructions Start Date   Diclofenac Sodium 1 % gel gel  Commonly known as: VOLTAREN   4 g, Topical, 2 Times Daily      lisinopril-hydrochlorothiazide 10-12.5 MG per tablet  Commonly known as: PRINZIDE,ZESTORETIC   1 tablet, Oral, 2 Times Daily      Ocuv[facility]Health Eye Health Formula capsule   1 capsule, Oral, Daily      rivaroxaban 15 MG tablet  Commonly known as: XARELTO   15 mg, Oral, Daily      ursodiol 300 MG capsule  Commonly known as: ACTIGALL   900 mg, Oral, Every Morning      ursodiol 300 MG capsule  Commonly known as: ACTIGALL   600 mg, Oral, Every Evening              Stop These Medications      flecainide 100 MG tablet  Commonly known as: TAMBOCOR                Additional Instructions for the Follow-ups that You Need to Schedule       Discharge Follow-up with PCP   As directed       Currently Documented PCP:    Miri Brennan DO    PCP Phone Number:    [phone]     Follow Up Details: 1 week post hospital follow up, repeat BMP to check potassium        Discharge Follow-up with Specialty: Cardiology; 2 Weeks   As directed      Specialty: Cardiology   Follow Up: 2 Weeks   Follow Up Details: Dr. Luther in 2 weeks for follow up of arrythmia and pacemaker check               Follow-up Information       Miri Brennan DO .    Specialty: Internal Medicine  Why: 1 week post hospital follow up, repeat BMP to check potassium  Contact information:  [address]  [address]  [facility] [id number] [phone]               Joseluis Cunningham MD .    Specialty: Family Medicine  Why: 1 week post hospital follow up, repeat BMP to check potassium  Contact information:  [address]  [facility] [id number] [phone]                              TEST  RESULTS PENDING AT DISCHARGE  Pending Labs       Order Current Status    Blood Culture - Blood, Arm, Left Preliminary result    Blood Culture - Blood, Arm, Right Preliminary result             CODE STATUS  Code Status and Medical Interventions:   Ordered at: 12/28/23 0038     Code Status (Patient has no pulse and is not breathing):    CPR (Attempt to Resuscitate)     Medical Interventions (Patient has pulse or is breathing):    Full Support       Codey Garza DO  [facility]  12/29/23  16:29 EST    Please note that this discharge summary required more than 30 minutes to complete.

## 2023-12-29 NOTE — PLAN OF CARE
Patient resting in the bed throughout the night. No s/s of distress noted. HR maintained stable throughout the night. Will continue to follow care plan.

## 2023-12-29 NOTE — PROGRESS NOTES
[facility]  PROGRESS NOTE    Patient information:  Name: Radha Negrete  Age/Sex: 78 y.o. female  :  1945        PCP: Miri Brennan DO  Attending: Rosalina Maynard DO  MRN:  [id number]  Visit Number:  [id number]    LOS:  LOS: 0 days     CODE STATUS:    Code Status and Medical Interventions:   Ordered at: 23 0038     Code Status (Patient has no pulse and is not breathing):    CPR (Attempt to Resuscitate)     Medical Interventions (Patient has pulse or is breathing):    Full Support       PROBLEM LIST:Principal Problem:    Hypokalemia      Reason for Cardiology follow-up: Runs of V tach and frequent PVCs s/p PM with history of A fib.     Subjective   ADMISSION INFORMATION:  Chief Complaint   Patient presents with    Atrial Fibrillation    Fatigue       HPI:  Radha Negrete is a 78 y.o. female with a past medical history significant for SSS status post [employer] pacemaker implantation 2022 per Dr. Howell, atrial fibrillation s/p ECV 2021 at [facility] ([facility]), hypertension, and arthritis.     Patient presented to [facility] ([facility]) emergency room (ER) on 2023 with complaints of fatigue. Patient denied fever, chills, nausea, vomiting, chest pain/pressure, palpitations, shortness of breath, dysuria, or diarrhea.  She denied any recent medication changes or known hypotension at home.  Patient is s/p right knee replacement around 11/3/2023 and she reports she has progressed well.  She reported intermittent cramping in the right calf and intermittent swelling in both ankles and was toed by the orthopedic surgeon that the swelling could continue 1-2 more months.  Patient is currently on Xarelto for stroke prevention in the setting of A-fib and has served as DVT prophylaxis in the postoperative setting.     Cardiology has been consulted for further evaluation and management runs of V tach and frequent  PVCs s/p PM with history of A fib.      Primary Cardiologist has been Dr. Luther she was last seen in the office on 02/22/2022.  She is also seen Dr. Howell and she was last seen in house at [facility] ([facility]) on 08/24/2022 when PM was implanted.     EVENT TIMELINE:   12/28/2023: Pacemaker interrogation, settings adjusted on pacemaker throughout for sensing and inappropriate increased pacing.   12/28/2023: Flecainide Dc'd.  12/28/2023: Metoprolol succinate XL 12.5 mg p.o. daily initiated.  12/28/2023: Echo: Left ventricular ejection fraction appears to be 61 - 65%. Left ventricular diastolic function was indeterminate. Left atrial volume is severely increased. Moderate tricuspid valve regurgitation is present. Estimated right ventricular systolic pressure from tricuspid regurgitation is moderately elevated (45-55 mmHg). Small pericardial effusion noted without any evidence of cardiac tamponade physiology. Moderate pulmonary hypertension noted with mild dilatation of IVC with decreased respiratory variation.  Please see full report attached below.    Interval History:   Patient is in room 302 A wound she was seen and examined.  Telemetry reveals paced rhythm 60s to 70s overnight.  No adverse events and saved telemetry strips.  Patient did have some ventricular arrhythmias that were caught on EKGs yesterday afternoon.  Please see attached EKGs below. Per nursing notes, patient did desat overnight and oxygen was supplemented.  Sodium 137, potassium 5.2, chloride 104, CO2 24.2, BUN 21, and creatinine 1.19 which is a slight bump up from 0.98.  Patient is sitting up on the edge of her bed resting quietly.  No acute distress noted at this time.  Upon further questioning, patient denied any chest pain, shortness of breath, or palpitations.    Vital Signs  Temp:  [97.9 °F (36.6 °C)-98.3 °F (36.8 °C)] 97.9 °F (36.6 °C)  Heart Rate:  [67-79] 70  Resp:  [18-20] 18  BP: (116-137)/(62-68) 123/68  Device (Oxygen  Therapy): room air  Vital Signs (last 72 hrs)         12/25 0700 12/26 0659 12/26 0700 12/27 0659 12/27 0700 12/28 0659 12/28 0700 12/29 0552   Most Recent      Temp (°F)     97.9 -  98.2    98 -  98.3     98.1 (36.7) 12/29 0348    Heart Rate     79 -  95    67 -  88     69 12/29 0348    Resp     19 -  21      18     18 12/29 0348    BP     114/69 -  154/66    116/63 -  140/84     124/62 12/29 0348    SpO2 (%)     94 -  99    95 -  97     96 12/29 0348          BMI:Body mass index is 33.92 kg/m².    WEIGHT:      12/28/23  0116 12/28/23  0500 12/29/23  0500   Weight: 99.7 kg (219 lb 11.2 oz) 99.7 kg (219 lb 11.2 oz) (admit weight, patient not on floor 24 hrs) 99.7 kg (219 lb 12.8 oz)       DIET:Diet: Cardiac Diets; Healthy Heart (2-3 Na+); Texture: Regular Texture (IDDSI 7); Fluid Consistency: Thin (IDDSI 0)    I&O:  Intake & Output (last 3 days)         12/26 0701  12/27 0700 12/27 0701 12/28 0700 12/28 0701 12/29 0700 12/29 0701 12/30 0700    P.O.  120 1600 840    I.V. (mL/kg)   50 (0.5)     Total Intake(mL/kg)  120 (1.2) 1650 (16.5) 840 (8.4)    Urine (mL/kg/hr)   950 (0.4) 650 (1.1)    Stool   0     Total Output   950 650    Net  +120 +700 +190            Urine Unmeasured Occurrence  3 x 2 x     Stool Unmeasured Occurrence   0 x             Objective     Physical Exam:      General Appearance:    Alert, cooperative, in no acute distress.   Head:    Normocephalic, without obvious abnormality, atraumatic.   Eyes:                          Conjunctivae and sclerae normal, no icterus, no pallor, corneas clear.   Neck:   No adenopathy, supple, trachea midline, no thyromegaly, no carotid bruit, no JVD.   Lungs:     Clear to auscultation, respirations regular, even and             unlabored.    Heart:    Regular rhythm and normal rate, normal S1 and S2, no          murmur, no gallop, no rub, no click   Chest Wall:    No abnormalities observed.   Abdomen:     Normal bowel sounds, no masses, no organomegaly, soft  "nontender, nondistended, no guarding, no rebound tenderness.   Extremities:   Moves all extremities well, no edema, no cyanosis, no           redness.   Pulses:   Pulses palpable and equal bilaterally.   Skin:   No bleeding, bruising or rash.   Neurologic:   Alert and Oriented x 3, Speech Clear & comprehensive.       Results review   Results Review:   Results from last 7 days   Lab Units 12/27/23  2234 12/27/23 2036   HSTROP T ng/L 23* 28*     Lab Results   Component Value Date    PROBNP 2,918.0 (H) 12/27/2023     Results from last 7 days   Lab Units 12/28/23  0155 12/27/23 2036   WBC 10*3/mm3 7.01 8.57   HEMOGLOBIN g/dL 10.6* 11.4*   PLATELETS 10*3/mm3 278 295     Results from last 7 days   Lab Units 12/29/23  0102 12/28/23  1442 12/28/23  0156 12/27/23 2036   SODIUM mmol/L 137 136 141 138   POTASSIUM mmol/L 5.2 3.9 3.0* 2.8*   CHLORIDE mmol/L 104 102 101 99   CO2 mmol/L 24.2 29.0 30.0* 31.0*   BUN mg/dL 21 17 19 21   CREATININE mg/dL 1.19* 0.98 0.86 1.04*   CALCIUM mg/dL 8.8 9.0 8.8 9.0   GLUCOSE mg/dL 103* 132* 111* 110*   ALT (SGPT) U/L  --   --   --  10   AST (SGOT) U/L  --   --   --  23     Lab Results   Component Value Date    MG 2.1 12/28/2023    MG 2.2 12/28/2023    MG 1.8 12/27/2023     Estimated Creatinine Clearance: 48.5 mL/min (A) (by C-G formula based on SCr of 1.19 mg/dL (H)).    No results found for: \"HGBA1C\"  No results found for: \"CHOL\", \"TRIG\", \"LDL\", \"HDL\"     Lab Results   Component Value Date    INR 2.09 (H) 10/19/2021     No results found for: \"LABHEPA\"    Lab Results   Component Value Date    TSH 1.630 12/27/2023      Pain Management Panel           No data to display              Microbiology Results (last 10 days)       Procedure Component Value - Date/Time    COVID-19 and FLU A/B PCR, 1 HR TAT - Swab, Nasopharynx [[id number]]  (Normal) Collected: 12/27/23 2115    Lab Status: Final result Specimen: Swab from Nasopharynx Updated: 12/27/23 2141     COVID19 Not Detected     Influenza A PCR " Not Detected     Influenza B PCR Not Detected    Narrative:      Fact sheet for providers: https://www.fda.gov/media/840126/download    Fact sheet for patients: https://www.fda.gov/media/218124/download    Test performed by PCR.    Blood Culture - Blood, Arm, Right [[id number]]  (Normal) Collected: 23    Lab Status: Preliminary result Specimen: Blood from Arm, Right Updated: 23     Blood Culture No growth at 24 hours    Blood Culture - Blood, Arm, Left [[id number]]  (Normal) Collected: 23    Lab Status: Preliminary result Specimen: Blood from Arm, Left Updated: 23     Blood Culture No growth at 24 hours           Imaging Results (Last 24 Hours)       ** No results found for the last 24 hours. **          ECHO:  Results for orders placed during the hospital encounter of 23    Adult Transthoracic Echo Complete W/ Cont if Necessary Per Protocol    Interpretation Summary    Left ventricular ejection fraction appears to be 61 - 65%.    Left ventricular diastolic function was indeterminate.    Left atrial volume is severely increased.    There is mild calcification of the aortic valve.    Moderate tricuspid valve regurgitation is present.    Estimated right ventricular systolic pressure from tricuspid regurgitation is moderately elevated (45-55 mmHg).    Small pericardial effusion noted without any evidence of cardiac tamponade physiology  Moderate pulmonary hypertension noted with mild dilatation of IVC with decreased respiratory variation      STRESS TEST:  No results found for this or any previous visit.      HEART CATH:  No results found for this or any previous visit.      PACEMAKER INTERROGATION :  2023        [id number]              TELEMETRY:     Paced 70s after adjusted dose of Metoprolol         Paced rhythm 70        I reviewed the patient's new clinical results.    ALLERGIES: Ceftriaxone and Latex    Medication Review:   Current list of medications  may not reflect those currently placed in orders that are not signed or are being held.     Diclofenac Sodium, 4 g, Topical, BID  lisinopril, 10 mg, Oral, Q24H   And  hydroCHLOROthiazide, 12.5 mg, Oral, Q24H  metoprolol succinate XL, 12.5 mg, Oral, Once  [START ON 12/30/2023] metoprolol succinate XL, 25 mg, Oral, Q24H  rivaroxaban, 15 mg, Oral, Daily With Dinner  senna-docusate sodium, 2 tablet, Oral, BID  sodium chloride, 10 mL, Intravenous, Q12H  ursodiol, 600 mg, Oral, Q PM  ursodiol, 900 mg, Oral, Daily           senna-docusate sodium **AND** polyethylene glycol **AND** bisacodyl **AND** bisacodyl    Calcium Replacement - Follow Nurse / BPA Driven Protocol    Magnesium Cardiology Dose Replacement - Follow Nurse / BPA Driven Protocol    nitroglycerin    Phosphorus Replacement - Follow Nurse / BPA Driven Protocol    Potassium Replacement - Follow Nurse / BPA Driven Protocol    sodium chloride    sodium chloride    sodium chloride    Assessment      Plan     Runs of V. tach and frequent PVCs- improved: none noted since EKGs as attached above.  None noted overnight per telemetry.  Telemetry revealed paced rhythm 70 this a.m.  Metoprolol succinate XL increased to 25 mg PO and a 1 x dose of 12.5 mg PO today to make her have a total of 25 mg today (12/29/2023).  If patient is able to ambulate well and B/P and HR tolerates increased dose of Metoprolol, patient is good to be discharged home later today from a cardiac standpoint.  Patient desires to follow up with her Primary Care Cardiologist- Dr Luther.   Possible new onset heart failure (HFpEF) with mild proBNP elevation with pulmonary vascular congestion on CXR possibly tachyarrhythmia induced.   12/28/2023 Echo: Left ventricular ejection fraction appears to be 61 - 65%. Left ventricular diastolic function was indeterminate. Left atrial volume is severely increased. Moderate tricuspid valve regurgitation is present. Estimated right ventricular systolic pressure from  tricuspid regurgitation is moderately elevated (45-55 mmHg). Small pericardial effusion noted without any evidence of cardiac tamponade physiology. Moderate pulmonary hypertension noted with mild dilatation of IVC with decreased respiratory variation.   Small pericardial effusion  Moderate pulmonary hypertension  History of SSS s/p pacemaker in situ 08/24/2022  Pacemaker interrogation done on 12/28/2023 and settings adjusted on pacemaker throughout for sensing and inappropriate increased pacing.   History of atrial fibrillation status post previous cardioversion 08/31/2021 at [facility].    [id number] of at least 5 (female, age, heart failure and hypertension)  Hypertension: Last recorded blood pressure was 123/68  Hypokalemia: Resolved after supplementing: K 5.2 today.  Borderline hypomagnesia: Resolved after supplementing: mag 2.1 12/28/23         I have spoke with the [employer] Representative Derrek Lama in regards to the ventricular rhythms patient had during the evening hours 12/28/2023 to see if her pacemaker needed any further adjustments as he did yesterday morning when he was here.  Derrek assured me there are no further adjustments he can do the the pacemaker to help with the ventricular rhythms and said it would have be on our side with medication adjustments. Adjustments have been made as stated above.     I have discussed the patients findings and recommendations with patient.    Thank you very much for asking us to be involved in this patient's care.  We will follow along with you.    I have discussed this patient with Dr. Rodgers and together, we have formed a plan of care for this patient as stated above in the recommendations.      Electronically signed by KARO Rodriguez, 12/29/23, 1:07 PM EST.                   Please note that portions of this note were completed with a voice recognition program.    Please note that portions of this note were copied and has been reviewed and is accurate as of  12/29/2023 .

## 2024-01-01 LAB
BACTERIA SPEC AEROBE CULT: NORMAL
BACTERIA SPEC AEROBE CULT: NORMAL

## 2024-01-02 ENCOUNTER — TELEPHONE (OUTPATIENT)
Dept: TELEMETRY | Facility: HOSPITAL | Age: 79
End: 2024-01-02
Payer: MEDICARE

## 2024-05-18 ENCOUNTER — HOSPITAL ENCOUNTER (EMERGENCY)
Facility: HOSPITAL | Age: 79
Discharge: LEFT BEFORE DISCHARGE | End: 2024-05-19
Admitting: EMERGENCY MEDICINE
Payer: MEDICARE

## 2024-05-18 ENCOUNTER — APPOINTMENT (OUTPATIENT)
Dept: GENERAL RADIOLOGY | Facility: HOSPITAL | Age: 79
End: 2024-05-18
Payer: MEDICARE

## 2024-05-18 PROCEDURE — 99283 EMERGENCY DEPT VISIT LOW MDM: CPT

## 2024-05-18 PROCEDURE — 71045 X-RAY EXAM CHEST 1 VIEW: CPT

## 2024-05-18 PROCEDURE — 71045 X-RAY EXAM CHEST 1 VIEW: CPT | Performed by: RADIOLOGY

## 2024-05-18 RX ORDER — SODIUM CHLORIDE 0.9 % (FLUSH) 0.9 %
10 SYRINGE (ML) INJECTION AS NEEDED
Status: DISCONTINUED | OUTPATIENT
Start: 2024-05-18 | End: 2024-05-19 | Stop reason: HOSPADM

## 2024-05-19 VITALS
DIASTOLIC BLOOD PRESSURE: 83 MMHG | SYSTOLIC BLOOD PRESSURE: 123 MMHG | HEIGHT: 67 IN | OXYGEN SATURATION: 95 % | RESPIRATION RATE: 16 BRPM | HEART RATE: 70 BPM | WEIGHT: 202 LBS | TEMPERATURE: 97.9 F | BODY MASS INDEX: 31.71 KG/M2

## 2024-05-19 NOTE — ED NOTES
Called patient back to ER triage at this time for q2 hour reassessment.  Patient denies any new/worsening symptoms.  VSS.  NADN.  RR is even and unlabored.  Patient is alert and oriented.

## 2024-05-19 NOTE — ED NOTES
MEDICAL SCREENING:    Reason for Visit: Generalized weakness    Patient initially seen in triage.  The patient was advised further evaluation and diagnostic testing will be needed, some of the treatment and testing will be initiated in the lobby in order to begin the process.  The patient will be returned to the waiting area for the time being and possibly be re-assessed by a subsequent ED provider.  The patient will be brought back to the treatment area in as timely manner as possible.      Shelley Brady, APRN  05/18/24 [id number]

## 2024-05-22 ENCOUNTER — TRANSCRIBE ORDERS (OUTPATIENT)
Dept: LAB | Facility: HOSPITAL | Age: 79
End: 2024-05-22
Payer: MEDICARE

## 2024-05-22 ENCOUNTER — LAB (OUTPATIENT)
Dept: LAB | Facility: HOSPITAL | Age: 79
End: 2024-05-22
Payer: MEDICARE

## 2024-05-22 DIAGNOSIS — I48.91 ATRIAL FIBRILLATION, UNSPECIFIED TYPE: ICD-10-CM

## 2024-05-22 DIAGNOSIS — I50.9 CHRONIC HEART FAILURE, UNSPECIFIED HEART FAILURE TYPE: ICD-10-CM

## 2024-05-22 DIAGNOSIS — I50.20 SYSTOLIC HEART FAILURE, UNSPECIFIED HF CHRONICITY: ICD-10-CM

## 2024-05-22 DIAGNOSIS — I50.9 CHRONIC HEART FAILURE, UNSPECIFIED HEART FAILURE TYPE: Primary | ICD-10-CM

## 2024-05-22 DIAGNOSIS — R06.00 DYSPNEA, UNSPECIFIED TYPE: ICD-10-CM

## 2024-05-22 LAB
ANION GAP SERPL CALCULATED.3IONS-SCNC: 11 MMOL/L (ref 5–15)
BUN SERPL-MCNC: 31 MG/DL (ref 8–23)
BUN/CREAT SERPL: 30.1 (ref 7–25)
CALCIUM SPEC-SCNC: 9.2 MG/DL (ref 8.6–10.5)
CHLORIDE SERPL-SCNC: 100 MMOL/L (ref 98–107)
CO2 SERPL-SCNC: 24 MMOL/L (ref 22–29)
CREAT SERPL-MCNC: 1.03 MG/DL (ref 0.57–1)
DEPRECATED RDW RBC AUTO: 45.3 FL (ref 37–54)
EGFRCR SERPLBLD CKD-EPI 2021: 56.1 ML/MIN/1.73
ERYTHROCYTE [DISTWIDTH] IN BLOOD BY AUTOMATED COUNT: 13.2 % (ref 12.3–15.4)
GLUCOSE SERPL-MCNC: 99 MG/DL (ref 65–99)
HCT VFR BLD AUTO: 35.2 % (ref 34–46.6)
HGB BLD-MCNC: 11.4 G/DL (ref 12–15.9)
MCH RBC QN AUTO: 30.6 PG (ref 26.6–33)
MCHC RBC AUTO-ENTMCNC: 32.4 G/DL (ref 31.5–35.7)
MCV RBC AUTO: 94.4 FL (ref 79–97)
NT-PROBNP SERPL-MCNC: ABNORMAL PG/ML (ref 0–1800)
PLATELET # BLD AUTO: 243 10*3/MM3 (ref 140–450)
PMV BLD AUTO: 10.2 FL (ref 6–12)
POTASSIUM SERPL-SCNC: 4.7 MMOL/L (ref 3.5–5.2)
RBC # BLD AUTO: 3.73 10*6/MM3 (ref 3.77–5.28)
SODIUM SERPL-SCNC: 135 MMOL/L (ref 136–145)
T4 SERPL-MCNC: 7.27 MCG/DL (ref 4.5–11.7)
TSH SERPL DL<=0.05 MIU/L-ACNC: 1.61 UIU/ML (ref 0.27–4.2)
WBC NRBC COR # BLD AUTO: 6.13 10*3/MM3 (ref 3.4–10.8)

## 2024-05-22 PROCEDURE — 85027 COMPLETE CBC AUTOMATED: CPT

## 2024-05-22 PROCEDURE — 36415 COLL VENOUS BLD VENIPUNCTURE: CPT

## 2024-05-22 PROCEDURE — 84443 ASSAY THYROID STIM HORMONE: CPT

## 2024-05-22 PROCEDURE — 80048 BASIC METABOLIC PNL TOTAL CA: CPT

## 2024-05-22 PROCEDURE — 84436 ASSAY OF TOTAL THYROXINE: CPT

## 2024-05-22 PROCEDURE — 83880 ASSAY OF NATRIURETIC PEPTIDE: CPT

## 2024-06-18 ENCOUNTER — HOSPITAL ENCOUNTER (OUTPATIENT)
Dept: CARDIOLOGY | Facility: HOSPITAL | Age: 79
Discharge: HOME | End: 2024-06-18
Payer: MEDICARE

## 2024-06-24 ENCOUNTER — HOSPITAL ENCOUNTER (OUTPATIENT)
Dept: CARDIOLOGY | Facility: HOSPITAL | Age: 79
Discharge: HOME | End: 2024-06-24
Attending: INTERNAL MEDICINE | Admitting: INTERNAL MEDICINE
Payer: MEDICARE

## 2024-06-24 VITALS
DIASTOLIC BLOOD PRESSURE: 75 MMHG | HEART RATE: 84 BPM | HEIGHT: 67 IN | RESPIRATION RATE: 14 BRPM | BODY MASS INDEX: 32.43 KG/M2 | OXYGEN SATURATION: 96 % | SYSTOLIC BLOOD PRESSURE: 108 MMHG | TEMPERATURE: 97.2 F | WEIGHT: 206.6 LBS

## 2024-06-24 DIAGNOSIS — I48.11 LONGSTANDING PERSISTENT ATRIAL FIBRILLATION: ICD-10-CM

## 2024-06-24 LAB
ANION GAP SERPL CALCULATED.3IONS-SCNC: 10 MMOL/L (ref 5–15)
BUN SERPL-MCNC: 44 MG/DL (ref 8–23)
BUN/CREAT SERPL: 36.1 (ref 7–25)
CALCIUM SPEC-SCNC: 9.2 MG/DL (ref 8.6–10.5)
CHLORIDE SERPL-SCNC: 99 MMOL/L (ref 98–107)
CO2 SERPL-SCNC: 30 MMOL/L (ref 22–29)
CREAT SERPL-MCNC: 1.22 MG/DL (ref 0.57–1)
DEPRECATED RDW RBC AUTO: 49.2 FL (ref 37–54)
EGFRCR SERPLBLD CKD-EPI 2021: 45.8 ML/MIN/1.73
ERYTHROCYTE [DISTWIDTH] IN BLOOD BY AUTOMATED COUNT: 14.6 % (ref 12.3–15.4)
GLUCOSE SERPL-MCNC: 102 MG/DL (ref 65–99)
HCT VFR BLD AUTO: 37.1 % (ref 34–46.6)
HGB BLD-MCNC: 12 G/DL (ref 12–15.9)
MCH RBC QN AUTO: 29.6 PG (ref 26.6–33)
MCHC RBC AUTO-ENTMCNC: 32.3 G/DL (ref 31.5–35.7)
MCV RBC AUTO: 91.6 FL (ref 79–97)
PLATELET # BLD AUTO: 235 10*3/MM3 (ref 140–450)
PMV BLD AUTO: 9.7 FL (ref 6–12)
POTASSIUM SERPL-SCNC: 3.5 MMOL/L (ref 3.5–5.2)
RBC # BLD AUTO: 4.05 10*6/MM3 (ref 3.77–5.28)
SODIUM SERPL-SCNC: 139 MMOL/L (ref 136–145)
WBC NRBC COR # BLD AUTO: 7.39 10*3/MM3 (ref 3.4–10.8)

## 2024-06-24 PROCEDURE — 80048 BASIC METABOLIC PNL TOTAL CA: CPT | Performed by: INTERNAL MEDICINE

## 2024-06-24 PROCEDURE — 92960 CARDIOVERSION ELECTRIC EXT: CPT

## 2024-06-24 PROCEDURE — 85027 COMPLETE CBC AUTOMATED: CPT | Performed by: INTERNAL MEDICINE

## 2024-06-24 PROCEDURE — 36415 COLL VENOUS BLD VENIPUNCTURE: CPT

## 2024-06-24 RX ORDER — FENTANYL CITRATE 50 UG/ML
50-100 INJECTION, SOLUTION INTRAMUSCULAR; INTRAVENOUS ONCE AS NEEDED
Status: DISCONTINUED | OUTPATIENT
Start: 2024-06-24 | End: 2024-06-24 | Stop reason: HOSPADM

## 2024-06-24 RX ORDER — SOTAGLIFLOZIN 200 MG/1
200 TABLET ORAL DAILY
COMMUNITY

## 2024-06-24 RX ORDER — MIDAZOLAM HYDROCHLORIDE 1 MG/ML
2-8 INJECTION INTRAMUSCULAR; INTRAVENOUS ONCE AS NEEDED
Status: DISCONTINUED | OUTPATIENT
Start: 2024-06-24 | End: 2024-06-24 | Stop reason: HOSPADM

## 2024-06-24 RX ORDER — FLECAINIDE ACETATE 50 MG/1
50 TABLET ORAL EVERY 12 HOURS
COMMUNITY
End: 2024-06-24 | Stop reason: HOSPADM

## 2024-06-24 RX ORDER — NALOXONE HCL 0.4 MG/ML
0.4 VIAL (ML) INJECTION ONCE AS NEEDED
Status: DISCONTINUED | OUTPATIENT
Start: 2024-06-24 | End: 2024-06-24 | Stop reason: HOSPADM

## 2024-06-24 RX ORDER — FUROSEMIDE 40 MG/1
40 TABLET ORAL 2 TIMES DAILY
Qty: 60 TABLET | Refills: 6 | Status: SHIPPED | OUTPATIENT
Start: 2024-06-24

## 2024-06-24 RX ORDER — FLUMAZENIL 0.1 MG/ML
0.5 INJECTION INTRAVENOUS ONCE AS NEEDED
Status: DISCONTINUED | OUTPATIENT
Start: 2024-06-24 | End: 2024-06-24 | Stop reason: HOSPADM

## 2024-06-24 RX ORDER — POTASSIUM CHLORIDE 1.5 G/1.58G
20 POWDER, FOR SOLUTION ORAL DAILY
COMMUNITY

## 2024-06-24 RX ORDER — FUROSEMIDE 40 MG/1
40 TABLET ORAL DAILY
Status: ON HOLD | COMMUNITY
End: 2024-06-24

## 2024-06-24 RX ORDER — AMIODARONE HYDROCHLORIDE 200 MG/1
200 TABLET ORAL 2 TIMES DAILY
Qty: 60 TABLET | Refills: 6 | Status: SHIPPED | OUTPATIENT
Start: 2024-06-24

## 2024-06-24 RX ORDER — SACUBITRIL AND VALSARTAN 24; 26 MG/1; MG/1
0.5 TABLET, FILM COATED ORAL 2 TIMES DAILY
Qty: 60 TABLET | Refills: 6 | Status: SHIPPED | OUTPATIENT
Start: 2024-06-24

## 2024-06-24 NOTE — H&P
[facility] - History & Physical     Radha Negrete  1945  2508/1      06/24/24      Identification:  Radha Negrete is a 78 y.o. female.      PCP:  Miri Brennan DO        Chief Complaint:   Allergies:  is allergic to ceftriaxone and latex.    Medications Prior to Admission   Medication Sig Dispense Refill Last Dose    Diclofenac Sodium (VOLTAREN) 1 % gel gel Apply 4 g topically to the appropriate area as directed 2 (Two) Times a Day.       flecainide (TAMBOCOR) 50 MG tablet Take 1 tablet by mouth Every 12 (Twelve) Hours.   6/23/2024    furosemide (LASIX) 40 MG tablet Take 1 tablet by mouth Daily.       lisinopril-hydrochlorothiazide (PRINZIDE,ZESTORETIC) 10-12.5 MG per tablet Take 1 tablet by mouth Daily.       metoprolol succinate XL (TOPROL-XL) 50 MG 24 hr tablet Take 1 tablet by mouth Daily for 30 days. 30 tablet 0 6/23/2024    Multiple Vitamins-Minerals (Ocuvite Eye Health Formula) capsule Take 1 capsule by mouth Daily.       potassium chloride (Klor-Con) 20 MEQ packet Take 20 mEq by mouth Daily.       rivaroxaban (XARELTO) 15 MG tablet Take 1 tablet by mouth Daily.       Sotagliflozin (Inpefa) 200 MG tablet Take 1 tablet by mouth Daily.       ursodiol (ACTIGALL) 300 MG capsule Take 3 capsules by mouth Every Morning.       ursodiol (ACTIGALL) 300 MG capsule Take 2 capsules by mouth Every Evening.                   CARDIAC RISK FACTORS:   {CV risk factors:510}.     HPI:  ***          Social History     Socioeconomic History    Marital status:    Tobacco Use    Smoking status: Never    Smokeless tobacco: Never   Vaping Use    Vaping status: Never Used   Substance and Sexual Activity    Alcohol use: Never    Drug use: Never    Sexual activity: Defer     Family History   Problem Relation Age of Onset    Colon polyps Brother     Colon cancer Neg Hx      Past Surgical History:   Procedure Laterality Date    BLADDER SURGERY      CARDIAC ELECTROPHYSIOLOGY PROCEDURE N/A  "8/24/2022    Procedure: Pacemaker DC new 86095;  Surgeon: Kevin Howell MD;  Location: [facility] INVASIVE LOCATION;  Service: Cardiology;  Laterality: N/A;    CARDIAC SURGERY      CARDIOVERSION      6/17/21,9/3/21,2/22/22    CHOLECYSTECTOMY      COLONOSCOPY  01/2016    normal    HYSTERECTOMY      PACEMAKER IMPLANTATION      08/24/2022 PER DR. HOWELL       Review of Systems:  Pertinent positives are listed above and in physical exam.  All others have been reviewed and are negative.     Objective:   Vitals:   height is 170.2 cm (67\") and weight is 93.7 kg (206 lb 9.6 oz). Her temporal temperature is 97.2 °F (36.2 °C). Her blood pressure is 108/75 and her pulse is 76. Her respiration is 14.  No intake or output data in the 24 hours ending 06/24/24 0744      Physical Exam:  General Appearance:    Alert, cooperative, in no acute distress   Head:    Normocephalic, without obvious abnormality, atraumatic   Eyes:            Lids and lashes normal, conjunctivae and sclerae normal, no   icterus, no pallor, corneas clear, PERRLA   Ears:    Ears appear intact with no abnormalities noted   Throat:   No oral lesions, no thrush, oral mucosa moist   Neck:   No adenopathy, supple, trachea midline, no thyromegaly, no carotid bruit, no JVD   Back:     No kyphosis present, no scoliosis present, no skin lesions,   erythema or scars, no tenderness to percussion or                  palpation,  range of motion normal   Lungs:     Clear to auscultation,respirations regular, even and               unlabored    Heart:    Regular rhythm and normal rate, normal S1 and S2, no         murmur, no gallop, no rub, no click   Abdomen:     Normal bowel sounds, no masses, no organomegaly, soft     nontender, nondistended, no guarding, no rebound               tenderness   Extremities:   Moves all extremities well,  no cyanosis, no redness, no edema   Pulses:   Pulses palpable and equal bilaterally   Skin:   No bleeding, bruising or rash " "  Lymph nodes:   No palpable adenopathy   Neurologic:   Cranial nerves 2 - 12 grossly intact, sensation intact, DTR    present and equal bilaterally          Results Review:  I personally reviewed the patient's clinical results.              Invalid input(s): \"LABALBU\", \"PROT\"                        Radiology:  Imaging Results (Last 72 Hours)       ** No results found for the last 72 hours. **            Tele:  ***    Assessment and Plan:    1.  ***  2.  ***  3.  ***  4.  ***    I discussed the patient's findings and my recommendations with the patient, any present family members, and the nursing staff.  ***, MD saw and examined patient, verified hx and PE, read all radiographic studies, reviewed labs and micro data, and formulated dx, plan for treatment and all medical decision making.      Megan Archuleta PA-C for *** , MD  06/24/24 07:44 EDT      "

## (undated) DEVICE — ARM SLING II: Brand: DEROYAL

## (undated) DEVICE — DRSNG SURG AQUACEL AG/ADVNTGE 9X15CM 3.5X6IN

## (undated) DEVICE — ELECTRD RETRN/GRND MEGADYNE SGL/PLT W/CORD 9FT DISP

## (undated) DEVICE — TBG PENCL TELESCP MEGADYNE SMOKE EVAC 10FT

## (undated) DEVICE — LIMB HOLDER, WRIST/ANKLE: Brand: DEROYAL

## (undated) DEVICE — INTRO TEAR AWAY/LVD W/SD PRT 6F 13CM

## (undated) DEVICE — 3M™ STERI-DRAPE™ INSTRUMENT POUCH 1018: Brand: STERI-DRAPE™

## (undated) DEVICE — 3M™ STERI-DRAPE™ FLUOROSCOPE DRAPE, 10 PER CARTON / 4 CARTONS PER CASE, 1012: Brand: STERI-DRAPE™

## (undated) DEVICE — ADHS SKIN PREMIERPRO EXOFIN TOPICAL HI/VISC .5ML

## (undated) DEVICE — ADHS LIQ MASTISOL 2/3ML

## (undated) DEVICE — 3M™ IOBAN™ 2 ANTIMICROBIAL INCISE DRAPE 6650EZ: Brand: IOBAN™ 2

## (undated) DEVICE — 8 FOOT DISPOSABLE BI-VENTRICULAR PACING CABLE WITH SAFE CONNECT / ALLIGATOR CLIP

## (undated) DEVICE — TRAP FLD MINIVAC MEGADYNE 100ML

## (undated) DEVICE — LEX CATH LAB MINOR: Brand: MEDLINE INDUSTRIES, INC.